# Patient Record
Sex: FEMALE | Race: WHITE | NOT HISPANIC OR LATINO | URBAN - METROPOLITAN AREA
[De-identification: names, ages, dates, MRNs, and addresses within clinical notes are randomized per-mention and may not be internally consistent; named-entity substitution may affect disease eponyms.]

---

## 2024-10-28 ENCOUNTER — INPATIENT (INPATIENT)
Facility: HOSPITAL | Age: 54
LOS: 6 days | Discharge: HOME | DRG: 65 | End: 2024-11-04
Attending: PSYCHIATRY & NEUROLOGY | Admitting: INTERNAL MEDICINE
Payer: COMMERCIAL

## 2024-10-28 VITALS
WEIGHT: 234.79 LBS | TEMPERATURE: 98 F | SYSTOLIC BLOOD PRESSURE: 184 MMHG | DIASTOLIC BLOOD PRESSURE: 124 MMHG | HEART RATE: 106 BPM | RESPIRATION RATE: 18 BRPM | OXYGEN SATURATION: 96 %

## 2024-10-28 DIAGNOSIS — E66.9 OBESITY, UNSPECIFIED: ICD-10-CM

## 2024-10-28 DIAGNOSIS — G40.409 OTHER GENERALIZED EPILEPSY AND EPILEPTIC SYNDROMES, NOT INTRACTABLE, WITHOUT STATUS EPILEPTICUS: ICD-10-CM

## 2024-10-28 DIAGNOSIS — I62.02 NONTRAUMATIC SUBACUTE SUBDURAL HEMORRHAGE: ICD-10-CM

## 2024-10-28 DIAGNOSIS — E78.5 HYPERLIPIDEMIA, UNSPECIFIED: ICD-10-CM

## 2024-10-28 DIAGNOSIS — Z91.199 PATIENT'S NONCOMPLIANCE WITH OTHER MEDICAL TREATMENT AND REGIMEN DUE TO UNSPECIFIED REASON: ICD-10-CM

## 2024-10-28 DIAGNOSIS — I10 ESSENTIAL (PRIMARY) HYPERTENSION: ICD-10-CM

## 2024-10-28 PROCEDURE — 93010 ELECTROCARDIOGRAM REPORT: CPT

## 2024-10-28 PROCEDURE — 70450 CT HEAD/BRAIN W/O DYE: CPT | Mod: 26,MC

## 2024-10-28 PROCEDURE — 99291 CRITICAL CARE FIRST HOUR: CPT

## 2024-10-28 RX ORDER — SODIUM CHLORIDE 9 MG/ML
1000 INJECTION, SOLUTION INTRAMUSCULAR; INTRAVENOUS; SUBCUTANEOUS ONCE
Refills: 0 | Status: COMPLETED | OUTPATIENT
Start: 2024-10-28 | End: 2024-10-28

## 2024-10-28 NOTE — ED ADULT TRIAGE NOTE - CHIEF COMPLAINT QUOTE
BIBA for seizure that lasted 1 minute. Pt states she has had flulike symptoms for about a week or 2. Family states pt vomited and was staring "into space" began to have a seizure.

## 2024-10-29 DIAGNOSIS — R56.9 UNSPECIFIED CONVULSIONS: ICD-10-CM

## 2024-10-29 LAB
ALBUMIN SERPL ELPH-MCNC: 4.5 G/DL — SIGNIFICANT CHANGE UP (ref 3.5–5.2)
ALP SERPL-CCNC: 125 U/L — HIGH (ref 30–115)
ALT FLD-CCNC: 21 U/L — SIGNIFICANT CHANGE UP (ref 0–41)
ANION GAP SERPL CALC-SCNC: 16 MMOL/L — HIGH (ref 7–14)
APPEARANCE UR: CLEAR — SIGNIFICANT CHANGE UP
APTT BLD: 33.6 SEC — SIGNIFICANT CHANGE UP (ref 27–39.2)
AST SERPL-CCNC: 16 U/L — SIGNIFICANT CHANGE UP (ref 0–41)
BACTERIA # UR AUTO: ABNORMAL /HPF
BASOPHILS # BLD AUTO: 0.05 K/UL — SIGNIFICANT CHANGE UP (ref 0–0.2)
BASOPHILS NFR BLD AUTO: 0.4 % — SIGNIFICANT CHANGE UP (ref 0–1)
BILIRUB SERPL-MCNC: 0.4 MG/DL — SIGNIFICANT CHANGE UP (ref 0.2–1.2)
BILIRUB UR-MCNC: NEGATIVE — SIGNIFICANT CHANGE UP
BUN SERPL-MCNC: 14 MG/DL — SIGNIFICANT CHANGE UP (ref 10–20)
CALCIUM SERPL-MCNC: 9.6 MG/DL — SIGNIFICANT CHANGE UP (ref 8.4–10.5)
CHLORIDE SERPL-SCNC: 95 MMOL/L — LOW (ref 98–110)
CO2 SERPL-SCNC: 24 MMOL/L — SIGNIFICANT CHANGE UP (ref 17–32)
COLOR SPEC: YELLOW — SIGNIFICANT CHANGE UP
CREAT SERPL-MCNC: 0.7 MG/DL — SIGNIFICANT CHANGE UP (ref 0.7–1.5)
DIFF PNL FLD: ABNORMAL
EGFR: 103 ML/MIN/1.73M2 — SIGNIFICANT CHANGE UP
EOSINOPHIL # BLD AUTO: 0.03 K/UL — SIGNIFICANT CHANGE UP (ref 0–0.7)
EOSINOPHIL NFR BLD AUTO: 0.2 % — SIGNIFICANT CHANGE UP (ref 0–8)
EPI CELLS # UR: PRESENT
FLUAV AG NPH QL: SIGNIFICANT CHANGE UP
FLUBV AG NPH QL: SIGNIFICANT CHANGE UP
GLUCOSE SERPL-MCNC: 141 MG/DL — HIGH (ref 70–99)
GLUCOSE UR QL: 100 MG/DL
HCG SERPL QL: NEGATIVE — SIGNIFICANT CHANGE UP
HCT VFR BLD CALC: 44.3 % — SIGNIFICANT CHANGE UP (ref 37–47)
HGB BLD-MCNC: 14.4 G/DL — SIGNIFICANT CHANGE UP (ref 12–16)
IMM GRANULOCYTES NFR BLD AUTO: 0.3 % — SIGNIFICANT CHANGE UP (ref 0.1–0.3)
INR BLD: 1.02 RATIO — SIGNIFICANT CHANGE UP (ref 0.65–1.3)
KETONES UR-MCNC: NEGATIVE MG/DL — SIGNIFICANT CHANGE UP
LEUKOCYTE ESTERASE UR-ACNC: NEGATIVE — SIGNIFICANT CHANGE UP
LYMPHOCYTES # BLD AUTO: 1 K/UL — LOW (ref 1.2–3.4)
LYMPHOCYTES # BLD AUTO: 7.4 % — LOW (ref 20.5–51.1)
MAGNESIUM SERPL-MCNC: 2.2 MG/DL — SIGNIFICANT CHANGE UP (ref 1.8–2.4)
MCHC RBC-ENTMCNC: 28 PG — SIGNIFICANT CHANGE UP (ref 27–31)
MCHC RBC-ENTMCNC: 32.5 G/DL — SIGNIFICANT CHANGE UP (ref 32–37)
MCV RBC AUTO: 86.2 FL — SIGNIFICANT CHANGE UP (ref 81–99)
MONOCYTES # BLD AUTO: 0.48 K/UL — SIGNIFICANT CHANGE UP (ref 0.1–0.6)
MONOCYTES NFR BLD AUTO: 3.5 % — SIGNIFICANT CHANGE UP (ref 1.7–9.3)
NEUTROPHILS # BLD AUTO: 11.99 K/UL — HIGH (ref 1.4–6.5)
NEUTROPHILS NFR BLD AUTO: 88.2 % — HIGH (ref 42.2–75.2)
NITRITE UR-MCNC: NEGATIVE — SIGNIFICANT CHANGE UP
NRBC # BLD: 0 /100 WBCS — SIGNIFICANT CHANGE UP (ref 0–0)
PH UR: 7.5 — SIGNIFICANT CHANGE UP (ref 5–8)
PLATELET # BLD AUTO: 322 K/UL — SIGNIFICANT CHANGE UP (ref 130–400)
PMV BLD: 10.8 FL — HIGH (ref 7.4–10.4)
POTASSIUM SERPL-MCNC: 3.9 MMOL/L — SIGNIFICANT CHANGE UP (ref 3.5–5)
POTASSIUM SERPL-SCNC: 3.9 MMOL/L — SIGNIFICANT CHANGE UP (ref 3.5–5)
PROT SERPL-MCNC: 7.9 G/DL — SIGNIFICANT CHANGE UP (ref 6–8)
PROT UR-MCNC: 100 MG/DL
PROTHROM AB SERPL-ACNC: 11.6 SEC — SIGNIFICANT CHANGE UP (ref 9.95–12.87)
RBC # BLD: 5.14 M/UL — SIGNIFICANT CHANGE UP (ref 4.2–5.4)
RBC # FLD: 13 % — SIGNIFICANT CHANGE UP (ref 11.5–14.5)
RBC CASTS # UR COMP ASSIST: 5 /HPF — HIGH (ref 0–4)
RSV RNA NPH QL NAA+NON-PROBE: SIGNIFICANT CHANGE UP
SARS-COV-2 RNA SPEC QL NAA+PROBE: SIGNIFICANT CHANGE UP
SODIUM SERPL-SCNC: 135 MMOL/L — SIGNIFICANT CHANGE UP (ref 135–146)
SP GR SPEC: 1.01 — SIGNIFICANT CHANGE UP (ref 1–1.03)
SQUAMOUS # UR AUTO: SIGNIFICANT CHANGE UP /HPF (ref 0–5)
TROPONIN T, HIGH SENSITIVITY RESULT: 10 NG/L — SIGNIFICANT CHANGE UP (ref 6–13)
UROBILINOGEN FLD QL: 0.2 MG/DL — SIGNIFICANT CHANGE UP (ref 0.2–1)
WBC # BLD: 13.59 K/UL — HIGH (ref 4.8–10.8)
WBC # FLD AUTO: 13.59 K/UL — HIGH (ref 4.8–10.8)
WBC UR QL: 5 /HPF — SIGNIFICANT CHANGE UP (ref 0–5)

## 2024-10-29 PROCEDURE — 70496 CT ANGIOGRAPHY HEAD: CPT | Mod: MC

## 2024-10-29 PROCEDURE — 82378 CARCINOEMBRYONIC ANTIGEN: CPT

## 2024-10-29 PROCEDURE — 70498 CT ANGIOGRAPHY NECK: CPT | Mod: 26,MC

## 2024-10-29 PROCEDURE — 97161 PT EVAL LOW COMPLEX 20 MIN: CPT | Mod: GP

## 2024-10-29 PROCEDURE — 80053 COMPREHEN METABOLIC PANEL: CPT

## 2024-10-29 PROCEDURE — 92610 EVALUATE SWALLOWING FUNCTION: CPT | Mod: GN

## 2024-10-29 PROCEDURE — 85025 COMPLETE CBC W/AUTO DIFF WBC: CPT

## 2024-10-29 PROCEDURE — 84100 ASSAY OF PHOSPHORUS: CPT

## 2024-10-29 PROCEDURE — 95700 EEG CONT REC W/VID EEG TECH: CPT

## 2024-10-29 PROCEDURE — 36226 PLACE CATH VERTEBRAL ART: CPT | Mod: RT

## 2024-10-29 PROCEDURE — 76937 US GUIDE VASCULAR ACCESS: CPT

## 2024-10-29 PROCEDURE — 36224 PLACE CATH CAROTD ART: CPT | Mod: 50

## 2024-10-29 PROCEDURE — 97166 OT EVAL MOD COMPLEX 45 MIN: CPT | Mod: GO

## 2024-10-29 PROCEDURE — 36415 COLL VENOUS BLD VENIPUNCTURE: CPT

## 2024-10-29 PROCEDURE — 86301 IMMUNOASSAY TUMOR CA 19-9: CPT

## 2024-10-29 PROCEDURE — A9579: CPT

## 2024-10-29 PROCEDURE — 70496 CT ANGIOGRAPHY HEAD: CPT | Mod: 26,MC

## 2024-10-29 PROCEDURE — 83036 HEMOGLOBIN GLYCOSYLATED A1C: CPT

## 2024-10-29 PROCEDURE — 99291 CRITICAL CARE FIRST HOUR: CPT

## 2024-10-29 PROCEDURE — 99223 1ST HOSP IP/OBS HIGH 75: CPT

## 2024-10-29 PROCEDURE — C1894: CPT

## 2024-10-29 PROCEDURE — 36227 PLACE CATH XTRNL CAROTID: CPT | Mod: LT

## 2024-10-29 PROCEDURE — 95708 EEG WO VID EA 12-26HR UNMNTR: CPT

## 2024-10-29 PROCEDURE — 70552 MRI BRAIN STEM W/DYE: CPT | Mod: 26

## 2024-10-29 PROCEDURE — ZZZZZ: CPT

## 2024-10-29 PROCEDURE — 95714 VEEG EA 12-26 HR UNMNTR: CPT

## 2024-10-29 PROCEDURE — 84443 ASSAY THYROID STIM HORMONE: CPT

## 2024-10-29 PROCEDURE — 83735 ASSAY OF MAGNESIUM: CPT

## 2024-10-29 PROCEDURE — 70552 MRI BRAIN STEM W/DYE: CPT | Mod: MC

## 2024-10-29 PROCEDURE — 82105 ALPHA-FETOPROTEIN SERUM: CPT

## 2024-10-29 PROCEDURE — C1887: CPT

## 2024-10-29 PROCEDURE — 80048 BASIC METABOLIC PNL TOTAL CA: CPT

## 2024-10-29 PROCEDURE — 93005 ELECTROCARDIOGRAM TRACING: CPT

## 2024-10-29 PROCEDURE — 80061 LIPID PANEL: CPT

## 2024-10-29 RX ORDER — LOSARTAN POTASSIUM 25 MG/1
50 TABLET ORAL ONCE
Refills: 0 | Status: COMPLETED | OUTPATIENT
Start: 2024-10-29 | End: 2024-10-29

## 2024-10-29 RX ORDER — CLONIDINE HYDROCHLORIDE 0.2 MG/1
0.1 TABLET ORAL ONCE
Refills: 0 | Status: COMPLETED | OUTPATIENT
Start: 2024-10-29 | End: 2024-10-29

## 2024-10-29 RX ORDER — NIFEDIPINE 90 MG
30 TABLET, EXTENDED RELEASE 24 HR ORAL DAILY
Refills: 0 | Status: DISCONTINUED | OUTPATIENT
Start: 2024-10-29 | End: 2024-10-29

## 2024-10-29 RX ORDER — ASPIRIN/MAG CARB/ALUMINUM AMIN 325 MG
324 TABLET ORAL ONCE
Refills: 0 | Status: COMPLETED | OUTPATIENT
Start: 2024-10-29 | End: 2024-10-29

## 2024-10-29 RX ORDER — SODIUM CHLORIDE 9 MG/ML
1000 INJECTION, SOLUTION INTRAMUSCULAR; INTRAVENOUS; SUBCUTANEOUS
Refills: 0 | Status: DISCONTINUED | OUTPATIENT
Start: 2024-10-29 | End: 2024-10-29

## 2024-10-29 RX ORDER — LORAZEPAM 2 MG
2 TABLET ORAL ONCE
Refills: 0 | Status: DISCONTINUED | OUTPATIENT
Start: 2024-10-29 | End: 2024-11-04

## 2024-10-29 RX ORDER — ENOXAPARIN SODIUM 40MG/0.4ML
40 SYRINGE (ML) SUBCUTANEOUS EVERY 24 HOURS
Refills: 0 | Status: DISCONTINUED | OUTPATIENT
Start: 2024-10-29 | End: 2024-10-29

## 2024-10-29 RX ORDER — LABETALOL HCL 200 MG
10 TABLET ORAL ONCE
Refills: 0 | Status: COMPLETED | OUTPATIENT
Start: 2024-10-29 | End: 2024-10-29

## 2024-10-29 RX ORDER — LEVETIRACETAM 500 MG/1
1000 TABLET, FILM COATED ORAL
Refills: 0 | Status: DISCONTINUED | OUTPATIENT
Start: 2024-10-29 | End: 2024-11-01

## 2024-10-29 RX ORDER — LIDOCAINE HYDROCHLORIDE 40 MG/ML
5 SOLUTION TOPICAL THREE TIMES A DAY
Refills: 0 | Status: DISCONTINUED | OUTPATIENT
Start: 2024-10-29 | End: 2024-11-04

## 2024-10-29 RX ORDER — CHLORHEXIDINE GLUCONATE 40 MG/ML
1 SOLUTION TOPICAL
Refills: 0 | Status: DISCONTINUED | OUTPATIENT
Start: 2024-10-29 | End: 2024-10-30

## 2024-10-29 RX ORDER — HEPARIN SODIUM 10000 [USP'U]/ML
5000 INJECTION INTRAVENOUS; SUBCUTANEOUS EVERY 8 HOURS
Refills: 0 | Status: DISCONTINUED | OUTPATIENT
Start: 2024-10-29 | End: 2024-10-29

## 2024-10-29 RX ORDER — ALPRAZOLAM 0.25 MG
0.5 TABLET ORAL ONCE
Refills: 0 | Status: DISCONTINUED | OUTPATIENT
Start: 2024-10-29 | End: 2024-10-29

## 2024-10-29 RX ORDER — PANTOPRAZOLE SODIUM 40 MG/1
40 TABLET, DELAYED RELEASE ORAL
Refills: 0 | Status: DISCONTINUED | OUTPATIENT
Start: 2024-10-29 | End: 2024-10-30

## 2024-10-29 RX ORDER — NIFEDIPINE 90 MG
60 TABLET, EXTENDED RELEASE 24 HR ORAL AT BEDTIME
Refills: 0 | Status: DISCONTINUED | OUTPATIENT
Start: 2024-10-29 | End: 2024-11-01

## 2024-10-29 RX ORDER — LEVETIRACETAM 500 MG/1
1000 TABLET, FILM COATED ORAL ONCE
Refills: 0 | Status: COMPLETED | OUTPATIENT
Start: 2024-10-29 | End: 2024-10-29

## 2024-10-29 RX ORDER — LABETALOL HCL 200 MG
10 TABLET ORAL ONCE
Refills: 0 | Status: DISCONTINUED | OUTPATIENT
Start: 2024-10-29 | End: 2024-10-29

## 2024-10-29 RX ORDER — HYDRALAZINE HYDROCHLORIDE 50 MG/1
10 TABLET, FILM COATED ORAL ONCE
Refills: 0 | Status: COMPLETED | OUTPATIENT
Start: 2024-10-29 | End: 2024-10-29

## 2024-10-29 RX ADMIN — Medication 10 MILLIGRAM(S): at 17:26

## 2024-10-29 RX ADMIN — HYDRALAZINE HYDROCHLORIDE 10 MILLIGRAM(S): 50 TABLET, FILM COATED ORAL at 19:39

## 2024-10-29 RX ADMIN — LEVETIRACETAM 400 MILLIGRAM(S): 500 TABLET, FILM COATED ORAL at 20:48

## 2024-10-29 RX ADMIN — Medication 10 MILLIGRAM(S): at 05:50

## 2024-10-29 RX ADMIN — Medication 60 MILLIGRAM(S): at 18:37

## 2024-10-29 RX ADMIN — Medication 10 MILLIGRAM(S): at 00:24

## 2024-10-29 RX ADMIN — Medication 0.5 MILLIGRAM(S): at 14:58

## 2024-10-29 RX ADMIN — CHLORHEXIDINE GLUCONATE 1 APPLICATION(S): 40 SOLUTION TOPICAL at 05:53

## 2024-10-29 RX ADMIN — LOSARTAN POTASSIUM 50 MILLIGRAM(S): 25 TABLET ORAL at 04:16

## 2024-10-29 RX ADMIN — CLONIDINE HYDROCHLORIDE 0.1 MILLIGRAM(S): 0.2 TABLET ORAL at 18:37

## 2024-10-29 RX ADMIN — SODIUM CHLORIDE 75 MILLILITER(S): 9 INJECTION, SOLUTION INTRAMUSCULAR; INTRAVENOUS; SUBCUTANEOUS at 05:51

## 2024-10-29 RX ADMIN — Medication 30 MILLIGRAM(S): at 15:35

## 2024-10-29 RX ADMIN — LIDOCAINE HYDROCHLORIDE 5 MILLILITER(S): 40 SOLUTION TOPICAL at 15:36

## 2024-10-29 RX ADMIN — Medication 324 MILLIGRAM(S): at 03:38

## 2024-10-29 RX ADMIN — SODIUM CHLORIDE 1000 MILLILITER(S): 9 INJECTION, SOLUTION INTRAMUSCULAR; INTRAVENOUS; SUBCUTANEOUS at 00:25

## 2024-10-29 NOTE — CONSULT NOTE ADULT - NS ATTEND AMEND GEN_ALL_CORE FT
53 yo RHF w/ [facility] currently p/w new onset witnessed GTC seizure with multiple areas of hypoattenuation on HCT suspect provoked seizure.  VEEG prelim with R PLEDs and MRI w/ subacute R temporal hematoma with recent CTA without evidence of medium-large vessel disease.  Has evidence of chronic microhemorrhages suspicious for vasculitis vs undiagnosed HTN.  Will need CTV r/o sinus thrombosis given location of hematoma.  Recommend continue Q4 neurochecks, avoid full anticoagulation, keep normotensive and consult BRYAN.  Will discuss possible DSA pending CTV results.  Continue VEEG for now.

## 2024-10-29 NOTE — ED PROVIDER NOTE - OBJECTIVE STATEMENT
54 years old female no significant history BIBA from home secondary to seizure activity witnessed by family.  As per brother, patient has been complaining feeling tired all day today.  He was talking to her and noted patient suddenly blind staring at the ceiling then become convulsing for about 1 minute.  Patient also urinated on herself at the time.  Patient become confused and unresponsive until EMS arrival.  Denies similar episode in the past.  Patient's mental status is back to her baseline in ED now.  Reports she had sore throat and upper respiratory symptoms over the past week and a half that has since resolved.  He was just feeling tired all day today.  No fever, headache, chest pain, abdominal pain, vomiting or diarrhea and urinary symptoms.  Denies history of seizure in the past.

## 2024-10-29 NOTE — H&P ADULT - NSHPPHYSICALEXAM_GEN_ALL_CORE
GENERAL:  53y/o obese W  Female NAD, resting comfortably.  HEAD:  Atraumatic, Normocephalic  EYES: EOMI, PERRLA, conjunctiva and sclera clear  NECK: Supple, No JVD, no cervical lymphadenopathy, non-tender  CHEST/LUNG: Clear to auscultation bilaterally; No wheeze, rhonchi, or rales  HEART: Regular rate and rhythm; S1&S2  ABDOMEN: Soft, Nontender, Nondistended x 4 quadrants; Bowel sounds present  EXTREMITIES:   Peripheral Pulses Present, No clubbing, no cyanosis, or no edema, no calf tenderness  PSYCH: AAOx3, cooperative, appropriate  NEUROLOGY: WNL  SKIN: WNL

## 2024-10-29 NOTE — H&P ADULT - HISTORY OF PRESENT ILLNESS
54-year-old white female with no significant past medical history comes to the hospital with first witnessed seizure as per witness patient was complaining of feeling tired woke up stared at the nila and had a generalized seizure and lost bladder function patient denies alcohol abuse or drug abuse or prior history of seizures, any fevers. in the emergency room blood patient's blood pressure was elevated and was seen and she was given IV labetalol.  No recurring seizure since  Pt is back to her base line.  Imaging in ER shows ischemic changes and white matter abnormalities. MRI recommended to r/o demyelinating disease. vs inflammatory process.   Neurology was consulted recommendations to admit to ICU stepdown with general neurochecks every 4 hours

## 2024-10-29 NOTE — H&P ADULT - ASSESSMENT
middle aged W w/ first seizure r/o demyelinating disease vs inflammatory process (neuro recommending q 4hr neuro checks)

## 2024-10-29 NOTE — CONSULT NOTE ADULT - ASSESSMENT
54y F no significant PMH presented to ED after seizure like episode, consisting of garbled speech/urinary incontinence followed by few hours of dizziness/lethargy. CTH reports decreased attenuation of periventricular and subcortical white matter.  CTA unremarkable. Patient is back to baseline this morning and neuro exam non focal. CTH concerning for etiologies related to ischemia v metastasis,  less likely demyelination.    Recommendations  - MRI Brain with and without gadolinium  - Continue q4 neuro checks   - No AEDs recommended at this time. Will reconsider if MRI Brain with acute pathology   - vEEG after MRI Brain   - Seizure precautions  - Medical management per primary team    Discussed with Dr Livingston attending   54y F no significant PMH presented to ED after seizure like episode, consisting of garbled speech/urinary incontinence followed by few hours of dizziness/lethargy. CTH reports decreased attenuation of periventricular and subcortical white matter.  CTA unremarkable. Patient is back to baseline this morning and neuro exam non focal. However, CTH concerning for etiologies related to ischemia v metastasis,  less likely demyelination.    Recommendations  - MRI Brain with and without gadolinium  - Continue q4 neuro checks   - No AEDs recommended at this time. Will reconsider if MRI Brain with acute pathology   - vEEG after MRI Brain   - Seizure precautions  - Medical management per primary team    Discussed with Dr Livingston attending

## 2024-10-29 NOTE — ED PROVIDER NOTE - PHYSICAL EXAMINATION
CONSTITUTIONAL: Well-appearing; in no apparent distress.   EYES: PERRL; EOM intact.   ENT: No tongue abrasions  CARDIOVASCULAR: Normal S1, S2; no murmurs, rubs, or gallops.   RESPIRATORY: Normal chest excursion with respiration; breath sounds clear and equal bilaterally; no wheezes, rhonchi, or rales.  GI/: Normal bowel sounds; non-distended; non-tender; no palpable organomegaly.   MS: No calf swelling and tenderness.  SKIN: Normal for age and race; warm; dry; good turgor; no apparent lesions or exudate.   NEURO/PSYCH: A & O x 4; CN II-XII grossly unremarkable.  Speaking coherently and moving all extremities.  Sensation and strength equal to bilateral upper and lower extremities.

## 2024-10-29 NOTE — PATIENT PROFILE ADULT - FALL HARM RISK - HARM RISK INTERVENTIONS

## 2024-10-29 NOTE — ED PROVIDER NOTE - INTERNATIONAL TRAVEL
Problem: Adult Inpatient Plan of Care  Goal: Plan of Care Review  PLAN OF CARE REVIEWED ON AM ROUNDS, AFREBRILE, VSS WBC 13  H/H STABLE CR1 MG 2 LFTS STABLE CHEVRON INCISION HEALING WITH STAPLES INTACT, LAST JUVE DCD, VOIDING WITHOUT PROBLEMS (+) BM  INCISIONAL PAIN CONTROLLED WITH PO OXY, UP AMBULATORY, WALKER PLACED AT BS FOR USE, GLUCOSES 100-200'S WITH SOME CORRECTION INSULIN REQUIRED ALONG WITH SCHEDULED NOVOLOG, PATIENT AND WIFE INSTRUCTED ON USE OF INSULIN PEN, PATIENT WILL NEED GLUCOMETER TEACHING PRIOR TO DC IF ENDO RECS INSULIN/ MONITORING, TELE REMAINS SR WITH CONTROLLED RATE, LOPRESSOR INCREASED PER CARDS RECS, TO RESTART ELIQUIS THIS PM, WIFE DID WELL WITH SELF MEDS, ATTENTIVE/ SUPPORTIVE TO PATIENT NEEDS, EMOTIONAL SUPPORT PROVIDED TO BOTH.       No

## 2024-10-29 NOTE — CONSULT NOTE ADULT - SUBJECTIVE AND OBJECTIVE BOX
NEUROLOGY CONSULT    HPI:  54-year-old white female with no significant past medical history comes to the hospital with first witnessed seizure as per witness patient was complaining of feeling tired woke up stared at the nila and had a generalized seizure and lost bladder function patient denies alcohol abuse or drug abuse or prior history of seizures, any fevers. in the emergency room blood patient's blood pressure was elevated and was seen and she was given IV labetalol.  No recurring seizure since  Pt is back to her base line.  Imaging in ER shows ischemic changes and white matter abnormalities. MRI recommended to r/o demyelinating disease. vs inflammatory process.   Neurology was consulted recommendations to admit to ICU stepdown with general neurochecks every 4 hours (29 Oct 2024 04:29)    Patient seen at bedside today. She notes that last night, as she was trying to fall asleep, she felt nauseas and vomited. After that her brother noted she was acting funny, and patient said she could hear herself answering him but her words did not make sense. She notes he did not mention her shaking. No LOC. She did have urinary incontinence. This episode lasted a few minutes, but for a few hours after she felt "out of it" and dizzy. She mentioned after the CT in ED that her tongue felt swollen; she does not recall biting it.     For 2-3 weeks prior she had a sore throat and headache, and felt that she was coming down with a cold as her brother was sick as well. She does not wake up with blood in her mouth. She has no personal/family history of seizures or other neuro disorders. She does not take any daily medications. No smoking/drugs/alcohol.      MEDICATIONS  Home Medications: none    MEDICATIONS  (STANDING):  chlorhexidine 2% Cloths 1 Application(s) Topical <User Schedule>  enoxaparin Injectable 40 milliGRAM(s) SubCutaneous every 24 hours  pantoprazole    Tablet 40 milliGRAM(s) Oral before breakfast  sodium chloride 0.9%. 1000 milliLiter(s) (75 mL/Hr) IV Continuous <Continuous>    MEDICATIONS  (PRN):  ALPRAZolam 0.5 milliGRAM(s) Oral once PRN prior to MRI  LORazepam   Injectable 2 milliGRAM(s) IV Push once PRN Seizure Activity      FAMILY HISTORY: no neuro disorders     SOCIAL HISTORY: negative for tobacco, alcohol, or illicit drug use.    Allergies  No Known Allergies    Intolerances        GEN: NAD, pleasant, cooperative    NEURO:   MENTAL STATUS: AAOx3  LANG/SPEECH: Fluent, intact naming, repetition & comprehension  CRANIAL NERVES:  II: Pupils equal round and reactive, no RAPD, normal visual fields  III, IV, VI: EOM intact, no gaze preference or deviation  V: normal  VII: no facial asymmetry  VIII: normal hearing to speech  MOTOR: 5/5 in both upper and lower extremities  REFLEXES: 2+ bilateral; downgoing toes  SENSORY: Normal to light touch in all extremities. No neglect.   COORD: Normal finger to nose and heel to shin, no tremor  Gait: deferred       LABS:                        14.4   13.59 )-----------( 322      ( 29 Oct 2024 00:24 )             44.3     10-29    135  |  95[L]  |  14  ----------------------------<  141[H]  3.9   |  24  |  0.7    Ca    9.6      29 Oct 2024 00:24  Mg     2.2     10-29    TPro  7.9  /  Alb  4.5  /  TBili  0.4  /  DBili  x   /  AST  16  /  ALT  21  /  AlkPhos  125[H]  10-29    Hemoglobin A1C:   Vitamin B12   PT/INR - ( 29 Oct 2024 00:25 )   PT: 11.60 sec;   INR: 1.02 ratio         PTT - ( 29 Oct 2024 00:25 )  PTT:33.6 sec  CAPILLARY BLOOD GLUCOSE          Urinalysis Basic - ( 29 Oct 2024 00:40 )    Color: Yellow / Appearance: Clear / S.012 / pH: x  Gluc: x / Ketone: Negative mg/dL  / Bili: Negative / Urobili: 0.2 mg/dL   Blood: x / Protein: 100 mg/dL / Nitrite: Negative   Leuk Esterase: Negative / RBC: 5 /HPF / WBC 5 /HPF   Sq Epi: x / Non Sq Epi: 3-5 /HPF / Bacteria: Few /HPF      Microbiology:    Urinalysis with Rflx Culture (collected 29 Oct 2024 00:40)        RADIOLOGY    < from: CT Head No Cont (10.28.24 @ 23:29) >  Impression:    Decreased attenuation of periventricular and subcortical white matter,   nonspecific in 54-year-old patient, and considerations include   inflammatory and demyelinating diseases such as multiple sclerosis.   Confluent regional decreased attenuation at right temporal lobe with loss   of gray-white junction, which could represent area of early ischemic   change. Further evaluation recommended with MR brain with IV contrast.    Chronic ischemic change to left occipital lobe.    --- End of Report ---    < end of copied text >      < from: CT Angio Head w/ IV Cont (10.29.24 @ 01:51) >  IMPRESSION:    No large vessel occlusion, aneurysm, or vascular malformation.    Scattered areas of diminished density, white matter lesions, better   depicted CT scan .    --- End of Report ---    < end of copied text >      < from: CT Angio Neck w/ IV Cont (10.29.24 @ 01:51) >  IMPRESSION:    No large vessel occlusion, aneurysm, or vascular malformation.    Scattered areas of diminished density, white matter lesions, better   depicted CT scan .    --- End of Report ---    < end of copied text >         NEUROLOGY CONSULT    HPI:  54-year-old white female with no significant past medical history comes to the hospital with first witnessed seizure as per witness patient was complaining of feeling tired woke up stared at the nila and had a generalized seizure and lost bladder function patient denies alcohol abuse or drug abuse or prior history of seizures, any fevers. in the emergency room blood patient's blood pressure was elevated and was seen and she was given IV labetalol.  No recurring seizure since  Pt is back to her base line.  Imaging in ER shows ischemic changes and white matter abnormalities. MRI recommended to r/o demyelinating disease. vs inflammatory process.   Neurology was consulted recommendations to admit to ICU stepdown with general neurochecks every 4 hours (29 Oct 2024 04:29)    Patient seen at bedside today. She notes that last night, as she was trying to fall asleep, she felt nauseas and vomited. After that her brother noted she was acting funny, and patient said she could hear herself answering him but her words did not make sense. She notes he did not mention her shaking. Unclear LOC. She did have urinary incontinence. This episode lasted a few minutes, but for a few hours after she felt "out of it" and dizzy. She mentioned after the CT in ED that her tongue felt swollen; she does not recall biting it.     For 2-3 weeks prior she had a sore throat and headache, and felt that she was coming down with a cold as her brother was sick as well. She does not wake up with blood in her mouth. She has no personal/family history of seizures or other neuro disorders. She does not take any daily medications. No smoking/drugs/alcohol.      MEDICATIONS  Home Medications: none    MEDICATIONS  (STANDING):  chlorhexidine 2% Cloths 1 Application(s) Topical <User Schedule>  enoxaparin Injectable 40 milliGRAM(s) SubCutaneous every 24 hours  pantoprazole    Tablet 40 milliGRAM(s) Oral before breakfast  sodium chloride 0.9%. 1000 milliLiter(s) (75 mL/Hr) IV Continuous <Continuous>    MEDICATIONS  (PRN):  ALPRAZolam 0.5 milliGRAM(s) Oral once PRN prior to MRI  LORazepam   Injectable 2 milliGRAM(s) IV Push once PRN Seizure Activity      FAMILY HISTORY: no neuro disorders     SOCIAL HISTORY: negative for tobacco, alcohol, or illicit drug use.    Allergies  No Known Allergies    Intolerances        GEN: NAD, pleasant, cooperative    NEURO:   MENTAL STATUS: AAOx3  LANG/SPEECH: Fluent, intact naming, repetition & comprehension  CRANIAL NERVES:  II: Pupils equal round and reactive, no RAPD, normal visual fields  III, IV, VI: EOM intact, no gaze preference or deviation  V: normal  VII: no facial asymmetry  VIII: normal hearing to speech  MOTOR: 5/5 in both upper and lower extremities  REFLEXES: 2+ bilateral; downgoing toes  SENSORY: Normal to light touch in all extremities. No neglect.   COORD: Normal finger to nose and heel to shin, no tremor  Gait: deferred       LABS:                        14.4   13.59 )-----------( 322      ( 29 Oct 2024 00:24 )             44.3     10-29    135  |  95[L]  |  14  ----------------------------<  141[H]  3.9   |  24  |  0.7    Ca    9.6      29 Oct 2024 00:24  Mg     2.2     10-29    TPro  7.9  /  Alb  4.5  /  TBili  0.4  /  DBili  x   /  AST  16  /  ALT  21  /  AlkPhos  125[H]  10-29    Hemoglobin A1C:   Vitamin B12   PT/INR - ( 29 Oct 2024 00:25 )   PT: 11.60 sec;   INR: 1.02 ratio         PTT - ( 29 Oct 2024 00:25 )  PTT:33.6 sec  CAPILLARY BLOOD GLUCOSE          Urinalysis Basic - ( 29 Oct 2024 00:40 )    Color: Yellow / Appearance: Clear / S.012 / pH: x  Gluc: x / Ketone: Negative mg/dL  / Bili: Negative / Urobili: 0.2 mg/dL   Blood: x / Protein: 100 mg/dL / Nitrite: Negative   Leuk Esterase: Negative / RBC: 5 /HPF / WBC 5 /HPF   Sq Epi: x / Non Sq Epi: 3-5 /HPF / Bacteria: Few /HPF      Microbiology:    Urinalysis with Rflx Culture (collected 29 Oct 2024 00:40)        RADIOLOGY    < from: CT Head No Cont (10.28.24 @ 23:29) >  Impression:    Decreased attenuation of periventricular and subcortical white matter,   nonspecific in 54-year-old patient, and considerations include   inflammatory and demyelinating diseases such as multiple sclerosis.   Confluent regional decreased attenuation at right temporal lobe with loss   of gray-white junction, which could represent area of early ischemic   change. Further evaluation recommended with MR brain with IV contrast.    Chronic ischemic change to left occipital lobe.    --- End of Report ---    < end of copied text >      < from: CT Angio Head w/ IV Cont (10.29.24 @ 01:51) >  IMPRESSION:    No large vessel occlusion, aneurysm, or vascular malformation.    Scattered areas of diminished density, white matter lesions, better   depicted CT scan .    --- End of Report ---    < end of copied text >      < from: CT Angio Neck w/ IV Cont (10.29.24 @ 01:51) >  IMPRESSION:    No large vessel occlusion, aneurysm, or vascular malformation.    Scattered areas of diminished density, white matter lesions, better   depicted CT scan .    --- End of Report ---    < end of copied text >         NEUROLOGY CONSULT    HPI:  54-year-old white female with no significant past medical history comes to the hospital with first witnessed seizure as per witness patient was complaining of feeling tired woke up stared at the nila and had a generalized seizure and lost bladder function patient denies alcohol abuse or drug abuse or prior history of seizures, any fevers. in the emergency room blood patient's blood pressure was elevated and was seen and she was given IV labetalol.  No recurring seizure since  Pt is back to her base line.  Imaging in ER shows ischemic changes and white matter abnormalities. MRI recommended to r/o demyelinating disease. vs inflammatory process.   Neurology was consulted recommendations to admit to ICU stepdown with general neurochecks every 4 hours (29 Oct 2024 04:29)    Patient seen at bedside today. She notes that last night, as she was trying to fall asleep, she felt nauseas and vomited. After that her brother noted she was acting funny, and patient said she could hear herself answering him but her words did not make sense. She notes he did not mention her shaking. Unclear LOC. She did have urinary incontinence. This episode lasted a few minutes, but for a few hours after she felt "out of it" and dizzy. She mentioned after the CT in ED that her tongue felt swollen; she does not recall biting it.     For 2-3 weeks prior she had a sore throat and headache, and felt that she was coming down with a cold as her brother was sick as well. She does not wake up with blood in her mouth. She has no personal/family history of seizures or other neuro disorders. She does not take any daily medications. No smoking/drugs/alcohol. She denies any previous episodes of neurological symptoms that she can recall.      MEDICATIONS  Home Medications: none    MEDICATIONS  (STANDING):  chlorhexidine 2% Cloths 1 Application(s) Topical <User Schedule>  enoxaparin Injectable 40 milliGRAM(s) SubCutaneous every 24 hours  pantoprazole    Tablet 40 milliGRAM(s) Oral before breakfast  sodium chloride 0.9%. 1000 milliLiter(s) (75 mL/Hr) IV Continuous <Continuous>    MEDICATIONS  (PRN):  ALPRAZolam 0.5 milliGRAM(s) Oral once PRN prior to MRI  LORazepam   Injectable 2 milliGRAM(s) IV Push once PRN Seizure Activity      FAMILY HISTORY: no neuro disorders     SOCIAL HISTORY: negative for tobacco, alcohol, or illicit drug use.    Allergies  No Known Allergies    Intolerances        GEN: NAD, pleasant, cooperative    NEURO:   MENTAL STATUS: AAOx3  LANG/SPEECH: Fluent, intact naming, repetition & comprehension  CRANIAL NERVES:  II: Pupils equal round and reactive, no RAPD, normal visual fields  III, IV, VI: EOM intact, no gaze preference or deviation  V: normal  VII: no facial asymmetry  VIII: normal hearing to speech  MOTOR: 5/5 in both upper and lower extremities  REFLEXES: 2+ bilateral; downgoing toes  SENSORY: Normal to light touch in all extremities. No neglect.   COORD: Normal finger to nose and heel to shin, no tremor  Gait: deferred       LABS:                        14.4   13.59 )-----------( 322      ( 29 Oct 2024 00:24 )             44.3     10-29    135  |  95[L]  |  14  ----------------------------<  141[H]  3.9   |  24  |  0.7    Ca    9.6      29 Oct 2024 00:24  Mg     2.2     10-29    TPro  7.9  /  Alb  4.5  /  TBili  0.4  /  DBili  x   /  AST  16  /  ALT  21  /  AlkPhos  125[H]  10-29    Hemoglobin A1C:   Vitamin B12   PT/INR - ( 29 Oct 2024 00:25 )   PT: 11.60 sec;   INR: 1.02 ratio         PTT - ( 29 Oct 2024 00:25 )  PTT:33.6 sec  CAPILLARY BLOOD GLUCOSE          Urinalysis Basic - ( 29 Oct 2024 00:40 )    Color: Yellow / Appearance: Clear / S.012 / pH: x  Gluc: x / Ketone: Negative mg/dL  / Bili: Negative / Urobili: 0.2 mg/dL   Blood: x / Protein: 100 mg/dL / Nitrite: Negative   Leuk Esterase: Negative / RBC: 5 /HPF / WBC 5 /HPF   Sq Epi: x / Non Sq Epi: 3-5 /HPF / Bacteria: Few /HPF      Microbiology:    Urinalysis with Rflx Culture (collected 29 Oct 2024 00:40)        RADIOLOGY    < from: CT Head No Cont (10.28.24 @ 23:29) >  Impression:    Decreased attenuation of periventricular and subcortical white matter,   nonspecific in 54-year-old patient, and considerations include   inflammatory and demyelinating diseases such as multiple sclerosis.   Confluent regional decreased attenuation at right temporal lobe with loss   of gray-white junction, which could represent area of early ischemic   change. Further evaluation recommended with MR brain with IV contrast.    Chronic ischemic change to left occipital lobe.    --- End of Report ---    < end of copied text >      < from: CT Angio Head w/ IV Cont (10.29.24 @ 01:51) >  IMPRESSION:    No large vessel occlusion, aneurysm, or vascular malformation.    Scattered areas of diminished density, white matter lesions, better   depicted CT scan .    --- End of Report ---    < end of copied text >      < from: CT Angio Neck w/ IV Cont (10.29.24 @ :51) >  IMPRESSION:    No large vessel occlusion, aneurysm, or vascular malformation.    Scattered areas of diminished density, white matter lesions, better   depicted CT scan .    --- End of Report ---    < end of copied text >

## 2024-10-29 NOTE — PROGRESS NOTE ADULT - SUBJECTIVE AND OBJECTIVE BOX
YOLANDA SANTILLAN 54y Female  MRN#: [id number]     Hospital Day:     Pt is currently admitted with the primary diagnosis of  Convulsions        SUBJECTIVE     Patient was seen this morning. Denies any complaints.                                             ----------------------------------------------------------  OBJECTIVE  PAST MEDICAL & SURGICAL HISTORY  No pertinent past medical history                                              -----------------------------------------------------------  ALLERGIES:  No Known Allergies                                            ------------------------------------------------------------    HOME MEDICATIONS  Home Medications:                           MEDICATIONS:  STANDING MEDICATIONS  chlorhexidine 2% Cloths 1 Application(s) Topical <User Schedule>  heparin   Injectable 5000 Unit(s) SubCutaneous every 8 hours  pantoprazole    Tablet 40 milliGRAM(s) Oral before breakfast  sodium chloride 0.9%. 1000 milliLiter(s) IV Continuous <Continuous>    PRN MEDICATIONS  LORazepam   Injectable 2 milliGRAM(s) IV Push once PRN                                            ------------------------------------------------------------  VITAL SIGNS: Last 24 Hours  T(C): 37.2 (29 Oct 2024 07:10), Max: 37.2 (29 Oct 2024 05:27)  T(F): 99 (29 Oct 2024 07:10), Max: 99 (29 Oct 2024 07:10)  HR: 92 (29 Oct 2024 06:15) (92 - 106)  BP: 163/72 (29 Oct 2024 06:15) (139/65 - 222/123)  BP(mean): 104 (29 Oct 2024 06:15) (104 - 132)  RR: 20 (29 Oct 2024 07:10) (18 - 30)  SpO2: 95% (29 Oct 2024 06:15) (95% - 99%)                                             --------------------------------------------------------------  LABS:                        14.4   13.59 )-----------( 322      ( 29 Oct 2024 00:24 )             44.3     10    135  |  95[L]  |  14  ----------------------------<  141[H]  3.9   |  24  |  0.7    Ca    9.6      29 Oct 2024 00:24  Mg     2.2     10-    TPro  7.9  /  Alb  4.5  /  TBili  0.4  /  DBili  x   /  AST  16  /  ALT  21  /  AlkPhos  125[H]  10-    PT/INR - ( 29 Oct 2024 00:25 )   PT: 11.60 sec;   INR: 1.02 ratio         PTT - ( 29 Oct 2024 00:25 )  PTT:33.6 sec  Urinalysis Basic - ( 29 Oct 2024 00:40 )    Color: Yellow / Appearance: Clear / S.012 / pH: x  Gluc: x / Ketone: Negative mg/dL  / Bili: Negative / Urobili: 0.2 mg/dL   Blood: x / Protein: 100 mg/dL / Nitrite: Negative   Leuk Esterase: Negative / RBC: 5 /HPF / WBC 5 /HPF   Sq Epi: x / Non Sq Epi: 3-5 /HPF / Bacteria: Few /HPF              Urinalysis with Rflx Culture (collected 29 Oct 2024 00:40)                                                --------------------------------------------------------------  PHYSICAL EXAM:  GENERAL: NAD, lying in bed comfortably  NERVOUS SYSTEM:  Alert & Oriented X3   CHEST/LUNG: Clear to auscultation bilaterally.   HEART: regular rate and rhythm;   ABDOMEN: Soft, Nontender, Nondistended  EXTREMITIES: No edema.                                          --------------------------------------------------------------

## 2024-10-29 NOTE — H&P ADULT - PROBLEM SELECTOR PLAN 1
Neurologist recommended neuro cks q4hr and neuro ICU stepdown admission  MRI in am  possible EEG  formal neuro consult  BP control

## 2024-10-29 NOTE — ED PROVIDER NOTE - PROGRESS NOTE DETAILS
spoke with Dr Bourgeois (Neuro), patient will need MRI w/ IV contrast and EEG. given elevated BP, admit to SDU for q4 neuro check

## 2024-10-29 NOTE — ED PROVIDER NOTE - CONSIDERATION OF ADMISSION OBSERVATION
Consideration of Admission/Observation [Appropriate medications for patient's presenting complaints were ordered and effects were reassessed].   [Patient's external records were reviewed]. [Additional history was obtained from brother     Escalation to [admission was considered.  At this time, patient requires inpatient hospitalization [- monitored setting].

## 2024-10-29 NOTE — H&P ADULT - NSHPLABSRESULTS_GEN_ALL_CORE
14.4   13.59 )-----------( 322      ( 29 Oct 2024 00:24 )             44.3       10-29    135  |  95[L]  |  14  ----------------------------<  141[H]  3.9   |  24  |  0.7    Ca    9.6      29 Oct 2024 00:24  Mg     2.2     10-29    TPro  7.9  /  Alb  4.5  /  TBili  0.4  /  DBili  x   /  AST  16  /  ALT  21  /  AlkPhos  125[H]  10-29        Urinalysis Basic - ( 29 Oct 2024 00:40 )    Color: Yellow / Appearance: Clear / S.012 / pH: x  Gluc: x / Ketone: Negative mg/dL  / Bili: Negative / Urobili: 0.2 mg/dL   Blood: x / Protein: 100 mg/dL / Nitrite: Negative   Leuk Esterase: Negative / RBC: 5 /HPF / WBC 5 /HPF   Sq Epi: x / Non Sq Epi: 3-5 /HPF / Bacteria: Few /HPF    PT/INR - ( 29 Oct 2024 00:25 )   PT: 11.60 sec;   INR: 1.02 ratio         PTT - ( 29 Oct 2024 00:25 )  PTT:33.6 sec    < from: CT Head No Cont (10.28.24 @ 23:29) >    mpression:    Decreased attenuation of periventricular and subcortical white matter,   nonspecific in 54-year-old patient, and considerations include   inflammatory and demyelinating diseases such as multiple sclerosis.   Confluent regional decreased attenuation at right temporal lobe with loss   of gray-white junction, which could represent area of early ischemic   change. Further evaluation recommended with MR brain with IV contrast.    Chronic ischemic change to left occipital lobe.      < end of copied text >    < from: CT Angio Neck w/ IV Cont (10.29.24 @ 01:51) >      IMPRESSION:    No large vessel occlusion, aneurysm, or vascular malformation.        < end of copied text >                CAPILLARY BLOOD GLUCOSE

## 2024-10-29 NOTE — PROVIDER CONTACT NOTE (OTHER) - SITUATION
Pt c/o of trouble speaking when EEG tech woke her up. Pt has been receiving meds to control hypertension. Neuro status checked - pt asymptomatic. PERRLA, BP still elevated. No other complaints of pain

## 2024-10-29 NOTE — CHART NOTE - NSCHARTNOTEFT_GEN_A_CORE
called by neurologist regarding abnormal VEEG. reading .  MD recommend to start  IV  & po Skyler butt MD's rec.

## 2024-10-29 NOTE — H&P ADULT - CRITICAL CARE ATTENDING COMMENT
Pt seen by PA and asim and agree w above.  Pt  is a 53 yo female no sig PMHx.  presented after having seizure like activity, + loss of bladder control.  CT head shows ?demylineation.  Recommend MRI. BP control.  Neurology eval.  Pt reports tongue swelling post ct,.  No lip swelling. Speaking in full sentences. Willl give diphenhydramine 25 mg PO once. Monitor  for angioedema.

## 2024-10-29 NOTE — PROGRESS NOTE ADULT - ASSESSMENT
55 y/o no pmhx, presenting for new onset seizure.     #New onset seizure   - CT head: Decreased attenuation of periventricular and subcortical white matter, nonspecific in 54-year-old patient, and considerations include inflammatory and demyelinating diseases such as multiple sclerosis. Chronic ischemic change to left occipital lobe.  - follow up neuro consult    q 4hrs neuro check   - f/u MRI   - f/u EEG     DIET: DASH  DVT prophylaxis: lovenox  CODE: full  DISPO: stepdown

## 2024-10-30 LAB
ALBUMIN SERPL ELPH-MCNC: 3.9 G/DL — SIGNIFICANT CHANGE UP (ref 3.5–5.2)
ALP SERPL-CCNC: 105 U/L — SIGNIFICANT CHANGE UP (ref 30–115)
ALT FLD-CCNC: 18 U/L — SIGNIFICANT CHANGE UP (ref 0–41)
ANION GAP SERPL CALC-SCNC: 14 MMOL/L — SIGNIFICANT CHANGE UP (ref 7–14)
AST SERPL-CCNC: 16 U/L — SIGNIFICANT CHANGE UP (ref 0–41)
BASOPHILS # BLD AUTO: 0.04 K/UL — SIGNIFICANT CHANGE UP (ref 0–0.2)
BASOPHILS NFR BLD AUTO: 0.4 % — SIGNIFICANT CHANGE UP (ref 0–1)
BILIRUB SERPL-MCNC: 0.8 MG/DL — SIGNIFICANT CHANGE UP (ref 0.2–1.2)
BUN SERPL-MCNC: 10 MG/DL — SIGNIFICANT CHANGE UP (ref 10–20)
CALCIUM SERPL-MCNC: 9.4 MG/DL — SIGNIFICANT CHANGE UP (ref 8.4–10.5)
CHLORIDE SERPL-SCNC: 104 MMOL/L — SIGNIFICANT CHANGE UP (ref 98–110)
CO2 SERPL-SCNC: 23 MMOL/L — SIGNIFICANT CHANGE UP (ref 17–32)
CREAT SERPL-MCNC: 0.7 MG/DL — SIGNIFICANT CHANGE UP (ref 0.7–1.5)
EGFR: 103 ML/MIN/1.73M2 — SIGNIFICANT CHANGE UP
EOSINOPHIL # BLD AUTO: 0.23 K/UL — SIGNIFICANT CHANGE UP (ref 0–0.7)
EOSINOPHIL NFR BLD AUTO: 2.3 % — SIGNIFICANT CHANGE UP (ref 0–8)
GLUCOSE SERPL-MCNC: 91 MG/DL — SIGNIFICANT CHANGE UP (ref 70–99)
HCT VFR BLD CALC: 42.3 % — SIGNIFICANT CHANGE UP (ref 37–47)
HGB BLD-MCNC: 13.9 G/DL — SIGNIFICANT CHANGE UP (ref 12–16)
IMM GRANULOCYTES NFR BLD AUTO: 0.4 % — HIGH (ref 0.1–0.3)
LYMPHOCYTES # BLD AUTO: 2.24 K/UL — SIGNIFICANT CHANGE UP (ref 1.2–3.4)
LYMPHOCYTES # BLD AUTO: 22 % — SIGNIFICANT CHANGE UP (ref 20.5–51.1)
MAGNESIUM SERPL-MCNC: 2.1 MG/DL — SIGNIFICANT CHANGE UP (ref 1.8–2.4)
MCHC RBC-ENTMCNC: 28.5 PG — SIGNIFICANT CHANGE UP (ref 27–31)
MCHC RBC-ENTMCNC: 32.9 G/DL — SIGNIFICANT CHANGE UP (ref 32–37)
MCV RBC AUTO: 86.7 FL — SIGNIFICANT CHANGE UP (ref 81–99)
MONOCYTES # BLD AUTO: 1.04 K/UL — HIGH (ref 0.1–0.6)
MONOCYTES NFR BLD AUTO: 10.2 % — HIGH (ref 1.7–9.3)
NEUTROPHILS # BLD AUTO: 6.59 K/UL — HIGH (ref 1.4–6.5)
NEUTROPHILS NFR BLD AUTO: 64.7 % — SIGNIFICANT CHANGE UP (ref 42.2–75.2)
NRBC # BLD: 0 /100 WBCS — SIGNIFICANT CHANGE UP (ref 0–0)
PHOSPHATE SERPL-MCNC: 3.3 MG/DL — SIGNIFICANT CHANGE UP (ref 2.1–4.9)
PLATELET # BLD AUTO: 308 K/UL — SIGNIFICANT CHANGE UP (ref 130–400)
PMV BLD: 10.1 FL — SIGNIFICANT CHANGE UP (ref 7.4–10.4)
POTASSIUM SERPL-MCNC: 3.5 MMOL/L — SIGNIFICANT CHANGE UP (ref 3.5–5)
POTASSIUM SERPL-SCNC: 3.5 MMOL/L — SIGNIFICANT CHANGE UP (ref 3.5–5)
PROT SERPL-MCNC: 7.2 G/DL — SIGNIFICANT CHANGE UP (ref 6–8)
RBC # BLD: 4.88 M/UL — SIGNIFICANT CHANGE UP (ref 4.2–5.4)
RBC # FLD: 13.2 % — SIGNIFICANT CHANGE UP (ref 11.5–14.5)
SODIUM SERPL-SCNC: 141 MMOL/L — SIGNIFICANT CHANGE UP (ref 135–146)
WBC # BLD: 10.18 K/UL — SIGNIFICANT CHANGE UP (ref 4.8–10.8)
WBC # FLD AUTO: 10.18 K/UL — SIGNIFICANT CHANGE UP (ref 4.8–10.8)

## 2024-10-30 PROCEDURE — 99233 SBSQ HOSP IP/OBS HIGH 50: CPT

## 2024-10-30 PROCEDURE — 93010 ELECTROCARDIOGRAM REPORT: CPT

## 2024-10-30 PROCEDURE — 99232 SBSQ HOSP IP/OBS MODERATE 35: CPT

## 2024-10-30 PROCEDURE — 95720 EEG PHY/QHP EA INCR W/VEEG: CPT

## 2024-10-30 PROCEDURE — 70496 CT ANGIOGRAPHY HEAD: CPT | Mod: 26

## 2024-10-30 RX ORDER — METOCLOPRAMIDE HCL 10 MG
10 TABLET ORAL ONCE
Refills: 0 | Status: COMPLETED | OUTPATIENT
Start: 2024-10-30 | End: 2024-10-30

## 2024-10-30 RX ADMIN — LEVETIRACETAM 1000 MILLIGRAM(S): 500 TABLET, FILM COATED ORAL at 17:40

## 2024-10-30 RX ADMIN — CHLORHEXIDINE GLUCONATE 1 APPLICATION(S): 40 SOLUTION TOPICAL at 05:56

## 2024-10-30 RX ADMIN — Medication 60 MILLIGRAM(S): at 21:33

## 2024-10-30 RX ADMIN — LEVETIRACETAM 1000 MILLIGRAM(S): 500 TABLET, FILM COATED ORAL at 05:52

## 2024-10-30 NOTE — PROGRESS NOTE ADULT - ASSESSMENT
53 y/o no pmhx, presenting for new onset seizure.     #New onset seizure   - CT head: Decreased attenuation of periventricular and subcortical white matter, nonspecific in 54-year-old patient, and considerations include inflammatory and demyelinating diseases such as multiple sclerosis. Chronic ischemic change to left occipital lobe.  -MRI:  1.  Late-subacute parenchymal hematoma within the right temporal lobe measuring about 2.3 cm with mild surrounding edema. No nodular or masslike enhancement but recommend follow-up imaging to demonstrate complete resolution.  2.  Mild-moderate white matter disease with corpus callosum involvement, nonspecific. Diagnostic considerations include chronic microvascular changes, demyelination and gliosis.  3.  Numerous microhemorrhages throughout the brain which can reflect sequela of hypertensive microhemorrhage, amyloid angiopathy, or prior inflammation/infection.  - started no keppra per neuro  - continue q 4hrs neuro check   - f/u video EEG   - CT venogram     DIET: DASH  DVT prophylaxis: lovenox  CODE: full  DISPO: stepdown

## 2024-10-30 NOTE — PROGRESS NOTE ADULT - SUBJECTIVE AND OBJECTIVE BOX
YOLANDA SANTILLAN 54y Female  MRN#: [id number]     Hospital Day: 1d    Pt is currently admitted with the primary diagnosis of  Convulsions        SUBJECTIVE     Patient was seen this morning. Denies any complaints.                                             ----------------------------------------------------------  OBJECTIVE  PAST MEDICAL & SURGICAL HISTORY  No pertinent past medical history                                              -----------------------------------------------------------  ALLERGIES:  No Known Allergies                                            ------------------------------------------------------------    HOME MEDICATIONS  Home Medications:                           MEDICATIONS:  STANDING MEDICATIONS  chlorhexidine 2% Cloths 1 Application(s) Topical <User Schedule>  levETIRAcetam 1000 milliGRAM(s) Oral two times a day  NIFEdipine XL 60 milliGRAM(s) Oral at bedtime  pantoprazole    Tablet 40 milliGRAM(s) Oral before breakfast    PRN MEDICATIONS  lidocaine 2% Viscous 5 milliLiter(s) Swish and Spit three times a day PRN  LORazepam   Injectable 2 milliGRAM(s) IV Push once PRN                                            ------------------------------------------------------------  VITAL SIGNS: Last 24 Hours  T(C): 36.3 (30 Oct 2024 07:10), Max: 37.1 (29 Oct 2024 15:05)  T(F): 97.3 (30 Oct 2024 07:10), Max: 98.8 (29 Oct 2024 23:25)  HR: 93 (30 Oct 2024 07:10) (83 - 100)  BP: 137/75 (30 Oct 2024 07:10) (125/56 - 193/107)  BP(mean): 99 (30 Oct 2024 07:10) (81 - 143)  RR: 16 (30 Oct 2024 07:10) (16 - 25)  SpO2: 93% (30 Oct 2024 07:10) (93% - 98%)      10-29-24 @ 07:01  -  10-30-24 @ 07:00  --------------------------------------------------------  IN: 1615 mL / OUT: 0 mL / NET: 1615 mL                                             --------------------------------------------------------------  LABS:                        14.4   13.59 )-----------( 322      ( 29 Oct 2024 00:24 )             44.3     10    135  |  95[L]  |  14  ----------------------------<  141[H]  3.9   |  24  |  0.7    Ca    9.6      29 Oct 2024 00:24  Mg     2.2     10-29    TPro  7.9  /  Alb  4.5  /  TBili  0.4  /  DBili  x   /  AST  16  /  ALT  21  /  AlkPhos  125[H]  10    PT/INR - ( 29 Oct 2024 00:25 )   PT: 11.60 sec;   INR: 1.02 ratio         PTT - ( 29 Oct 2024 00:25 )  PTT:33.6 sec  Urinalysis Basic - ( 29 Oct 2024 00:40 )    Color: Yellow / Appearance: Clear / S.012 / pH: x  Gluc: x / Ketone: Negative mg/dL  / Bili: Negative / Urobili: 0.2 mg/dL   Blood: x / Protein: 100 mg/dL / Nitrite: Negative   Leuk Esterase: Negative / RBC: 5 /HPF / WBC 5 /HPF   Sq Epi: x / Non Sq Epi: 3-5 /HPF / Bacteria: Few /HPF              Urinalysis with Rflx Culture (collected 29 Oct 2024 00:40)                                                --------------------------------------------------------------  PHYSICAL EXAM:  GENERAL: NAD, lying in bed comfortably  NERVOUS SYSTEM:  Alert & Oriented X3   CHEST/LUNG: Clear to auscultation bilaterally.   HEART: regular rate and rhythm;   ABDOMEN: Soft, Nontender, Nondistended  EXTREMITIES: No edema.                                          --------------------------------------------------------------

## 2024-10-30 NOTE — CHART NOTE - NSCHARTNOTEFT_GEN_A_CORE
TRANSFER NOTE    From: [facility]   To: Neurology at [place]  Accepting Physician: Dr. Alvarado       Patient is a 54y old  Female who presents with a chief complaint of s/p generalized seizure (30 Oct 2024 08:30)      HPI: 54-year-old white female with no significant past medical history comes to the hospital with first witnessed seizure as per witness patient was complaining of feeling tired woke up stared at the nila and had a generalized seizure and lost bladder function patient denies alcohol abuse or drug abuse or prior history of seizures, any fevers. in the emergency room blood patient's blood pressure was elevated and was seen and she was given IV labetalol.  No recurring seizure since  Pt is back to her base line.     Hospital Course:  No seizures while admitted.  CT head: Decreased attenuation of periventricular and subcortical white matter, nonspecific in 54-year-old patient, and considerations include inflammatory and demyelinating diseases such as multiple sclerosis. Chronic ischemic change to left occipital lobe. MRI: 1.  Late-subacute parenchymal hematoma within the right temporal lobe measuring about 2.3 cm with mild surrounding edema. No nodular or masslike enhancement but recommend follow-up imaging to demonstrate complete resolution. 2.  Mild-moderate white matter disease with corpus callosum involvement, nonspecific. Diagnostic considerations include chronic microvascular changes, demyelination and gliosis. 3.  Numerous microhemorrhages throughout the brain which can reflect sequela of hypertensive microhemorrhage, amyloid angiopathy, or prior inflammation/infection.  Video EEG on going. Neurology recommended starting Keppra and doing CT venogram, both are ordered.       T(C): 36.3 (10-30-24 @ 07:10), Max: 37.1 (10-29-24 @ 15:05)  HR: 93 (10-30-24 @ 07:10) (83 - 100)  BP: 137/75 (10-30-24 @ 07:10) (125/56 - 193/107)  RR: 16 (10-30-24 @ 07:10) (16 - 25)  SpO2: 93% (10-30-24 @ 07:10) (93% - 98%)  Wt(kg): --Vital Signs Last 24 Hrs  T(C): 36.3 (30 Oct 2024 07:10), Max: 37.1 (29 Oct 2024 15:05)  T(F): 97.3 (30 Oct 2024 07:10), Max: 98.8 (29 Oct 2024 23:25)  HR: 93 (30 Oct 2024 07:10) (83 - 100)  BP: 137/75 (30 Oct 2024 07:10) (125/56 - 193/107)  BP(mean): 99 (30 Oct 2024 07:10) (81 - 143)  RR: 16 (30 Oct 2024 07:10) (16 - 25)  SpO2: 93% (30 Oct 2024 07:10) (93% - 98%)    Parameters below as of 30 Oct 2024 04:33  Patient On (Oxygen Delivery Method): room air      PHYSICAL EXAM:  GENERAL: NAD  HEART: Regular rate and rhythm  LUNG: Clear to auscultation bilaterally, No wheezes, No rhonchi  ABDOMEN: Soft, Nontender, Nondistended  NEURO: Alert & Oriented X3  EXTREMITIES: No LE edema, No calf tenderness      MEDICATIONS:  chlorhexidine 2% Cloths 1 Application(s) Topical <User Schedule>  levETIRAcetam 1000 milliGRAM(s) Oral two times a day  lidocaine 2% Viscous 5 milliLiter(s) Swish and Spit three times a day PRN  LORazepam   Injectable 2 milliGRAM(s) IV Push once PRN  NIFEdipine XL 60 milliGRAM(s) Oral at bedtime  pantoprazole    Tablet 40 milliGRAM(s) Oral before breakfast          LABS:                        14.4   13.59 )-----------( 322      ( 29 Oct 2024 00:24 )             44.3     10-29    135  |  95[L]  |  14  ----------------------------<  141[H]  3.9   |  24  |  0.7    Ca    9.6      29 Oct 2024 00:24  Mg     2.2     10-29    TPro  7.9  /  Alb  4.5  /  TBili  0.4  /  DBili  x   /  AST  16  /  ALT  21  /  AlkPhos  125[H]  10-29    PT/INR - ( 29 Oct 2024 00:25 )   PT: 11.60 sec;   INR: 1.02 ratio         PTT - ( 29 Oct 2024 00:25 )  PTT:33.6 sec  Urinalysis Basic - ( 29 Oct 2024 00:40 )    Color: Yellow / Appearance: Clear / S.012 / pH: x  Gluc: x / Ketone: Negative mg/dL  / Bili: Negative / Urobili: 0.2 mg/dL   Blood: x / Protein: 100 mg/dL / Nitrite: Negative   Leuk Esterase: Negative / RBC: 5 /HPF / WBC 5 /HPF   Sq Epi: x / Non Sq Epi: 3-5 /HPF / Bacteria: Few /HPF      CAPILLARY BLOOD GLUCOSE            Urinalysis Basic - ( 29 Oct 2024 00:40 )    Color: Yellow / Appearance: Clear / S.012 / pH: x  Gluc: x / Ketone: Negative mg/dL  / Bili: Negative / Urobili: 0.2 mg/dL   Blood: x / Protein: 100 mg/dL / Nitrite: Negative   Leuk Esterase: Negative / RBC: 5 /HPF / WBC 5 /HPF   Sq Epi: x / Non Sq Epi: 3-5 /HPF / Bacteria: Few /HPF          RADIOLOGY & ADDITIONAL TESTS:    Assessment/Plan:    55 y/o no pmhx, presenting for new onset seizure.     #New onset seizure   - CT head: Decreased attenuation of periventricular and subcortical white matter, nonspecific in 54-year-old patient, and considerations include inflammatory and demyelinating diseases such as multiple sclerosis. Chronic ischemic change to left occipital lobe.  -MRI:  1.  Late-subacute parenchymal hematoma within the right temporal lobe measuring about 2.3 cm with mild surrounding edema. No nodular or masslike enhancement but recommend follow-up imaging to demonstrate complete resolution.  2.  Mild-moderate white matter disease with corpus callosum involvement, nonspecific. Diagnostic considerations include chronic microvascular changes, demyelination and gliosis.  3.  Numerous microhemorrhages throughout the brain which can reflect sequela of hypertensive microhemorrhage, amyloid angiopathy, or prior inflammation/infection.  - started no keppra   - continue q 4hrs neuro check   - f/u video EEG   - CT venogram f/u   - transfer to stroke unit in [place] under Dr. Alvarado's care   - avoid anticoagulation / antiplatelets     DIET: DASH  DVT prophylaxis: SCDs  CODE: full  DISPO: stepdown TRANSFER NOTE    From: ICU south   To: Neurology at [facility]  Accepting Physician: Dr. Alvarado       Patient is a 54y old  Female who presents with a chief complaint of s/p generalized seizure (30 Oct 2024 08:30)      HPI: 54-year-old white female with no significant past medical history comes to the hospital with first witnessed seizure as per witness patient was complaining of feeling tired woke up stared at the nila and had a generalized seizure and lost bladder function patient denies alcohol abuse or drug abuse or prior history of seizures, any fevers. in the emergency room blood patient's blood pressure was elevated and was seen and she was given IV labetalol.  No recurring seizure since  Pt is back to her base line.     Hospital Course:  No seizures while admitted.  CT head: Decreased attenuation of periventricular and subcortical white matter, nonspecific in 54-year-old patient, and considerations include inflammatory and demyelinating diseases such as multiple sclerosis. Chronic ischemic change to left occipital lobe. MRI: 1.  Late-subacute parenchymal hematoma within the right temporal lobe measuring about 2.3 cm with mild surrounding edema. No nodular or masslike enhancement but recommend follow-up imaging to demonstrate complete resolution. 2.  Mild-moderate white matter disease with corpus callosum involvement, nonspecific. Diagnostic considerations include chronic microvascular changes, demyelination and gliosis. 3.  Numerous microhemorrhages throughout the brain which can reflect sequela of hypertensive microhemorrhage, amyloid angiopathy, or prior inflammation/infection.  Video EEG on going. Neurology recommended starting Keppra and doing CT venogram, both are ordered.       T(C): 36.3 (10-30-24 @ 07:10), Max: 37.1 (10-29-24 @ 15:05)  HR: 93 (10-30-24 @ 07:10) (83 - 100)  BP: 137/75 (10-30-24 @ 07:10) (125/56 - 193/107)  RR: 16 (10-30-24 @ 07:10) (16 - 25)  SpO2: 93% (10-30-24 @ 07:10) (93% - 98%)  Wt(kg): --Vital Signs Last 24 Hrs  T(C): 36.3 (30 Oct 2024 07:10), Max: 37.1 (29 Oct 2024 15:05)  T(F): 97.3 (30 Oct 2024 07:10), Max: 98.8 (29 Oct 2024 23:25)  HR: 93 (30 Oct 2024 07:10) (83 - 100)  BP: 137/75 (30 Oct 2024 07:10) (125/56 - 193/107)  BP(mean): 99 (30 Oct 2024 07:10) (81 - 143)  RR: 16 (30 Oct 2024 07:10) (16 - 25)  SpO2: 93% (30 Oct 2024 07:10) (93% - 98%)    Parameters below as of 30 Oct 2024 04:33  Patient On (Oxygen Delivery Method): room air      PHYSICAL EXAM:  GENERAL: NAD  HEART: Regular rate and rhythm  LUNG: Clear to auscultation bilaterally, No wheezes, No rhonchi  ABDOMEN: Soft, Nontender, Nondistended  NEURO: Alert & Oriented X3  EXTREMITIES: No LE edema, No calf tenderness      MEDICATIONS:  chlorhexidine 2% Cloths 1 Application(s) Topical <User Schedule>  levETIRAcetam 1000 milliGRAM(s) Oral two times a day  lidocaine 2% Viscous 5 milliLiter(s) Swish and Spit three times a day PRN  LORazepam   Injectable 2 milliGRAM(s) IV Push once PRN  NIFEdipine XL 60 milliGRAM(s) Oral at bedtime  pantoprazole    Tablet 40 milliGRAM(s) Oral before breakfast          LABS:                        14.4   13.59 )-----------( 322      ( 29 Oct 2024 00:24 )             44.3     10-29    135  |  95[L]  |  14  ----------------------------<  141[H]  3.9   |  24  |  0.7    Ca    9.6      29 Oct 2024 00:24  Mg     2.2     10-29    TPro  7.9  /  Alb  4.5  /  TBili  0.4  /  DBili  x   /  AST  16  /  ALT  21  /  AlkPhos  125[H]  10-29    PT/INR - ( 29 Oct 2024 00:25 )   PT: 11.60 sec;   INR: 1.02 ratio         PTT - ( 29 Oct 2024 00:25 )  PTT:33.6 sec  Urinalysis Basic - ( 29 Oct 2024 00:40 )    Color: Yellow / Appearance: Clear / S.012 / pH: x  Gluc: x / Ketone: Negative mg/dL  / Bili: Negative / Urobili: 0.2 mg/dL   Blood: x / Protein: 100 mg/dL / Nitrite: Negative   Leuk Esterase: Negative / RBC: 5 /HPF / WBC 5 /HPF   Sq Epi: x / Non Sq Epi: 3-5 /HPF / Bacteria: Few /HPF      CAPILLARY BLOOD GLUCOSE            Urinalysis Basic - ( 29 Oct 2024 00:40 )    Color: Yellow / Appearance: Clear / S.012 / pH: x  Gluc: x / Ketone: Negative mg/dL  / Bili: Negative / Urobili: 0.2 mg/dL   Blood: x / Protein: 100 mg/dL / Nitrite: Negative   Leuk Esterase: Negative / RBC: 5 /HPF / WBC 5 /HPF   Sq Epi: x / Non Sq Epi: 3-5 /HPF / Bacteria: Few /HPF          RADIOLOGY & ADDITIONAL TESTS:    Assessment/Plan:    55 y/o no pmhx, presenting for new onset seizure.     #New onset seizure   - CT head: Decreased attenuation of periventricular and subcortical white matter, nonspecific in 54-year-old patient, and considerations include inflammatory and demyelinating diseases such as multiple sclerosis. Chronic ischemic change to left occipital lobe.  -MRI:  1.  Late-subacute parenchymal hematoma within the right temporal lobe measuring about 2.3 cm with mild surrounding edema. No nodular or masslike enhancement but recommend follow-up imaging to demonstrate complete resolution.  2.  Mild-moderate white matter disease with corpus callosum involvement, nonspecific. Diagnostic considerations include chronic microvascular changes, demyelination and gliosis.  3.  Numerous microhemorrhages throughout the brain which can reflect sequela of hypertensive microhemorrhage, amyloid angiopathy, or prior inflammation/infection.  - started no keppra   - continue q 8hrs neuro check   - f/u video EEG   - CT venogram f/u   - transfer to stroke unit in [place] under Dr. Alvarado's care   - avoid anticoagulation / antiplatelets     DIET: DASH  DVT prophylaxis: SCDs  CODE: full  DISPO: stepdown

## 2024-10-30 NOTE — PROGRESS NOTE ADULT - ASSESSMENT
54-year-old female with no significant past medical history comes to the hospital with first witnessed seizure.     seizure / HTN / R temporal lobe 2.3cm hematoma / diffuse microhemorrhages     - continue Keppra 1gm q12h   - continue Nifedipine   - continue VEEG   - transfer to [facility] as per neurology - for DSA   - check CT venogram

## 2024-10-30 NOTE — PROGRESS NOTE ADULT - ASSESSMENT
53 yo RHF w/ [facility] currently p/w new onset witnessed seizure with multiple areas of hypoattenuation on HCT suspect provoked seizure.  VEEG prelim with R PLEDs and MRI w/ subacute R temporal hematoma with recent CTA without evidence of medium-large vessel disease.  Has evidence of chronic microhemorrhages suspicious for vasculitis vs undiagnosed HTN. CTV pending to r/o sinus thrombosis given location of hematoma. On exam today, patient with some paraphrasic errors and visual disturbance.     55 yo RHF w/ [facility] currently p/w new onset witnessed seizure with multiple areas of hypoattenuation on HCT suspect provoked seizure.  VEEG prelim with R PLEDs and MRI w/ subacute R temporal hematoma with recent CTA without evidence of medium-large vessel disease.  Has evidence of chronic microhemorrhages suspicious for vasculitis vs undiagnosed HTN. CTV pending to r/o sinus thrombosis given location of hematoma. On exam today, patient with some paraphrasic errors and visual disturbance.      Recommendations  - Change neuro checks to q8hrs  - Maintain normotension   - Continue vEEG (discontinue for transport, and then rehook at [place])   - F/u CTV  - Speech Therapy/OT/PT Evaluation   - NeuroIR Consult (Dr Murry)   - SCD compression stocking  - No anti platelets/anti coagulants at this time   - LABS: ANCA, AHSAN, ESR/CRP, Beta 2 glycoprotein, MMA, homocysteine, thyroid autoantibodies, SSA/SSB, ACE level, Complement levels (CH50, C3, C4)   - Transfer to [place] for possible DSA. Admit to Dr Alvarado, 3E   - Primary team made aware of plan    Discussed with Dr Livingston attending    55 yo RHF w/ [facility] currently p/w new onset witnessed seizure with multiple areas of hypoattenuation on HCT suspect provoked seizure.  VEEG prelim with R PLEDs and MRI w/ subacute R temporal hematoma with recent CTA without evidence of medium-large vessel disease.  Has evidence of chronic microhemorrhages suspicious for vasculitis vs undiagnosed HTN. CTV pending to r/o sinus thrombosis given location of hematoma. On exam today, patient with some paraphrasic errors and visual disturbance.      Recommendations  - Change neuro checks to q8hrs  - Maintain normotension   - Continue vEEG (discontinue for transport, and then rehook at [place])   - F/u CTV and official report vEEG   - Speech Therapy/OT/PT Evaluation   - NeuroIR Consult (Dr Murry)   - SCD compression stocking  - No anti platelets/anti coagulants at this time   - LABS: ANCA, AHSAN, ESR/CRP, Beta 2 glycoprotein, MMA, homocysteine, thyroid autoantibodies, SSA/SSB, ACE level, Complement levels (CH50, C3, C4)   - Transfer to [place] for possible DSA. Admit to Dr Alvarado, 3E   - Primary team made aware of plan    Discussed with Dr Livingston attending    53 yo RHF w/ [facility] currently p/w new onset witnessed seizure with multiple areas of hypoattenuation on HCT suspect provoked seizure.  VEEG prelim with R PLEDs and MRI w/ subacute R temporal hematoma with recent CTA without evidence of medium-large vessel disease.  Has evidence of chronic microhemorrhages suspicious for vasculitis vs undiagnosed HTN. CTV pending to r/o sinus thrombosis given location of hematoma. On exam today, patient with some paraphrasic errors and visual disturbance.      Recommendations  - Change neuro checks to q8hrs  - Maintain normotension   - Continue vEEG (discontinue for transport, and then rehook at [place])   - Continue Keppra 1000mg bid  - F/u CTV and official report vEEG   - Speech Therapy/OT/PT Evaluation   - NeuroIR Consult (Dr Murry)   - SCD compression stocking  - No anti platelets/anti coagulants at this time   - LABS: ANCA, AHSAN, ESR/CRP, Beta 2 glycoprotein, MMA, homocysteine, thyroid autoantibodies, SSA/SSB, ACE level, Complement levels (CH50, C3, C4)   - Transfer to [place] for possible DSA. Admit to Dr Alvarado, 3E   - Primary team made aware of plan    Discussed with Dr Livingston attending    53 yo RHF w/ [facility] currently p/w new onset witnessed seizure with multiple areas of hypoattenuation on HCT suspect provoked seizure.  VEEG reports interictal activity; MRI w/ subacute R temporal hematoma with recent CTA without evidence of medium-large vessel disease.  Has evidence of chronic microhemorrhages suspicious for vasculitis vs undiagnosed HTN. CTV pending to r/o sinus thrombosis given location of hematoma. On exam today, patient with some paraphrasic errors and visual disturbance.      Recommendations  - Change neuro checks to q8hrs  - Maintain normotension   - Continue vEEG (discontinue for transport, and then rehook at [place])   - Continue Keppra 1000mg bid  - F/u CTV and official report vEEG   - Speech Therapy/OT/PT Evaluation   - NeuroIR Consult (Dr Murry)   - SCD compression stocking  - No anti platelets/anti coagulants at this time   - LABS: ANCA, AHSAN, ESR/CRP, Beta 2 glycoprotein, MMA, homocysteine, thyroid autoantibodies, SSA/SSB, ACE level, Complement levels (CH50, C3, C4)   - Transfer to [place] for possible DSA. Admit to Dr Alvarado, 3E   - Primary team made aware of plan    Discussed with Dr Livingston attending    53 yo RHF w/ [facility] currently p/w new onset witnessed seizure with multiple areas of hypoattenuation on HCT suspect provoked seizure.  VEEG reports interictal activity and right hemispheric, mainly temporal and central focal slowing; MRI w/ subacute R temporal hematoma with recent CTA without evidence of medium-large vessel disease.  Has evidence of chronic microhemorrhages suspicious for vasculitis vs undiagnosed HTN. CTV pending to r/o sinus thrombosis given location of hematoma. On exam today, patient with some paraphrasic errors and visual disturbance.      Recommendations  - Change neuro checks to q8hrs  - Maintain normotension   - Continue vEEG (discontinue for transport, and then rehook at [place])   - Continue Keppra 1000mg bid  - F/u CTV and official report vEEG   - Speech Therapy/OT/PT Evaluation   - NeuroIR Consult (Dr Murry)   - SCD compression stocking  - No anti platelets/anti coagulants at this time   - LABS: ANCA, AHSAN, ESR/CRP, Beta 2 glycoprotein, MMA, homocysteine, thyroid autoantibodies, SSA/SSB, ACE level, Complement levels (CH50, C3, C4)   - Transfer to [place] for possible DSA. Admit to Dr Alvarado, 3E   - Primary team made aware of plan    Discussed with Dr Livingston attending

## 2024-10-30 NOTE — PROGRESS NOTE ADULT - NS ATTEND AMEND GEN_ALL_CORE FT
55 yo RHF w/ [facility] currently p/w new onset witnessed GTC seizure with multiple areas of hypoattenuation on HCT suspect provoked seizure found with R-sided PLEDs likely secondary to subacute R temporal hematoma.  Has minimal risk factors for ICH but with evidence of chronic microhemorrhages possible vasculitis vs undiagnosed HTN.  Await CTV r/o sinus thrombosis given location of hematoma.  Discussed with neuroendovascular Dr. Murry and vascular neurology Dr. Bourgeois- plan for transfer to [address] under stroke service and possible diagnostic angiogram later this week.  In meantime await CTV, continue VEEG, keep normotensive and avoid anticoagulation/antiplts while awaiting transfer.  Discussed with ICU team.  Rest of recommendations as above.

## 2024-10-30 NOTE — CHART NOTE - NSCHARTNOTEFT_GEN_A_CORE
The neuroendovascular team was contacted due to the finding of late-subacute parenchymal hematoma within the right temporal lobe (~2.3cm) reported on MR head. Numerous micro hemorrhages, possibly representing amyloid angiopathy vs cavernomas, were also observed. The patient cannot be examined in person while at [facility]. A diagnostic cerebral angiogram may be considered should the patient transfer to [facility]. Radiographical imaging and clinical history were reviewed by Dr. Murry and the neuroendovascular ACP.   [phone] Neuroendovascular with questions/ concerns

## 2024-10-30 NOTE — PROGRESS NOTE ADULT - SUBJECTIVE AND OBJECTIVE BOX
Neurology Progress Note    Interval History:  The patient was seen and examined at the bedside. She notes when she was woken up around half an hour ago she felt like for a few minutes she was "talking stupid" again. In addition, during today's encounter she is noticing that after a person moves, she still feels as if she still sees their shadow in that space, or still sees them doing the same movement over again. She knows it is not real but can still see it. She denies having this experience with auditory input.     She adds that over the last week she has been noticing episodes of "talking stupid", lasting minutes at a time. She didn't think much of it initially. In addition, she denies any recent head trauma/falls.     History of Present Illness:  54-year-old white female with no significant past medical history comes to the hospital with first witnessed seizure as per witness patient was complaining of feeling tired woke up stared at the nila and had a generalized seizure and lost bladder function patient denies alcohol abuse or drug abuse or prior history of seizures, any fevers. in the emergency room blood patient's blood pressure was elevated and was seen and she was given IV labetalol.  No recurring seizure since  Pt is back to her base line.  Imaging in ER shows ischemic changes and white matter abnormalities. MRI recommended to r/o demyelinating disease. vs inflammatory process.   Neurology was consulted recommendations to admit to ICU stepdown with general neurochecks every 4 hours (29 Oct 2024 04:29)        PAST MEDICAL & SURGICAL HISTORY:  No pertinent past medical history            Medications:  chlorhexidine 2% Cloths 1 Application(s) Topical <User Schedule>  levETIRAcetam 1000 milliGRAM(s) Oral two times a day  lidocaine 2% Viscous 5 milliLiter(s) Swish and Spit three times a day PRN  LORazepam   Injectable 2 milliGRAM(s) IV Push once PRN  NIFEdipine XL 60 milliGRAM(s) Oral at bedtime  pantoprazole    Tablet 40 milliGRAM(s) Oral before breakfast      Vital Signs Last 24 Hrs  T(C): 36.3 (30 Oct 2024 07:10), Max: 37.1 (29 Oct 2024 15:05)  T(F): 97.3 (30 Oct 2024 07:10), Max: 98.8 (29 Oct 2024 23:25)  HR: 93 (30 Oct 2024 07:10) (83 - 100)  BP: 137/75 (30 Oct 2024 07:10) (125/56 - 193/107)  BP(mean): 99 (30 Oct 2024 07:10) (81 - 143)  RR: 16 (30 Oct 2024 07:10) (16 - 25)  SpO2: 93% (30 Oct 2024 07:10) (93% - 98%)    Parameters below as of 30 Oct 2024 04:33  Patient On (Oxygen Delivery Method): room air      GEN: NAD, pleasant, cooperative    NEURO:   MENTAL STATUS: AAOx3  LANG/SPEECH: Fluent, ++paraphrasic errors ulises with repetition ("No ands ifs or whats about it" instead of "No ands ifs or buts about it". Intact comprehension   CRANIAL NERVES:  II: Normal visual fields. After nurse passes in the hallway, she sees her shadow pass again right after   III, IV, VI: EOM intact, no gaze preference or deviation  V: normal  VII: no facial asymmetry  VIII: normal hearing to speech  MOTOR: 5/5 in both upper and lower extremities  REFLEXES:  downgoing toes  SENSORY: Normal to light touch in all extremities. No neglect.   COORD: Normal finger to nose and heel to shin, no tremor  Gait: deferred       Labs:  CBC Full  -  ( 29 Oct 2024 00:24 )  WBC Count : 13.59 K/uL  RBC Count : 5.14 M/uL  Hemoglobin : 14.4 g/dL  Hematocrit : 44.3 %  Platelet Count - Automated : 322 K/uL  Mean Cell Volume : 86.2 fL  Mean Cell Hemoglobin : 28.0 pg  Mean Cell Hemoglobin Concentration : 32.5 g/dL  Auto Neutrophil # : 11.99 K/uL  Auto Lymphocyte # : 1.00 K/uL  Auto Monocyte # : 0.48 K/uL  Auto Eosinophil # : 0.03 K/uL  Auto Basophil # : 0.05 K/uL  Auto Neutrophil % : 88.2 %  Auto Lymphocyte % : 7.4 %  Auto Monocyte % : 3.5 %  Auto Eosinophil % : 0.2 %  Auto Basophil % : 0.4 %    10-29    135  |  95[L]  |  14  ----------------------------<  141[H]  3.9   |  24  |  0.7    Ca    9.6      29 Oct 2024 00:24  Mg     2.2     10-29    TPro  7.9  /  Alb  4.5  /  TBili  0.4  /  DBili  x   /  AST  16  /  ALT  21  /  AlkPhos  125[H]  10-29    LIVER FUNCTIONS - ( 29 Oct 2024 00:24 )  Alb: 4.5 g/dL / Pro: 7.9 g/dL / ALK PHOS: 125 U/L / ALT: 21 U/L / AST: 16 U/L / GGT: x           PT/INR - ( 29 Oct 2024 00:25 )   PT: 11.60 sec;   INR: 1.02 ratio         PTT - ( 29 Oct 2024 00:25 )  PTT:33.6 sec  Urinalysis Basic - ( 29 Oct 2024 00:40 )    Color: Yellow / Appearance: Clear / S.012 / pH: x  Gluc: x / Ketone: Negative mg/dL  / Bili: Negative / Urobili: 0.2 mg/dL   Blood: x / Protein: 100 mg/dL / Nitrite: Negative   Leuk Esterase: Negative / RBC: 5 /HPF / WBC 5 /HPF   Sq Epi: x / Non Sq Epi: 3-5 /HPF / Bacteria: Few /HPF     Neurology Progress Note    Interval History:  The patient was seen and examined at the bedside. She notes when she was woken up around half an hour ago she felt like for a few minutes she was "talking stupid" again. In addition, during today's encounter she is noticing that after a person moves, she still feels as if she still sees their shadow in that space, or still sees them doing the same movement over again. She knows it is not real but can still see it. She denies having this experience with auditory input.     She adds that over the last week she has been noticing episodes of "talking stupid", lasting minutes at a time. She didn't think much of it initially. In addition, she denies any recent head trauma/falls. Last saw PCP 2 years ago. Had HTN during pregnancy, and while she had kidney stones, but at last PCP visit was told BP was normal. Never hospitalized for HTN. Some history of high lipids, but has not taken meds for it.     History of Present Illness:  54-year-old white female with no significant past medical history comes to the hospital with first witnessed seizure as per witness patient was complaining of feeling tired woke up stared at the nila and had a generalized seizure and lost bladder function patient denies alcohol abuse or drug abuse or prior history of seizures, any fevers. in the emergency room blood patient's blood pressure was elevated and was seen and she was given IV labetalol.  No recurring seizure since  Pt is back to her base line.  Imaging in ER shows ischemic changes and white matter abnormalities. MRI recommended to r/o demyelinating disease. vs inflammatory process.   Neurology was consulted recommendations to admit to ICU stepdown with general neurochecks every 4 hours (29 Oct 2024 04:29)        PAST MEDICAL & SURGICAL HISTORY:  high BP during pregnancy and during treatment for kidney stones    FAMILY HISTORY  No FHX early onset dementia/CVA. No FHx rheum conditions             Medications:  chlorhexidine 2% Cloths 1 Application(s) Topical <User Schedule>  levETIRAcetam 1000 milliGRAM(s) Oral two times a day  lidocaine 2% Viscous 5 milliLiter(s) Swish and Spit three times a day PRN  LORazepam   Injectable 2 milliGRAM(s) IV Push once PRN  NIFEdipine XL 60 milliGRAM(s) Oral at bedtime  pantoprazole    Tablet 40 milliGRAM(s) Oral before breakfast      Vital Signs Last 24 Hrs  T(C): 36.3 (30 Oct 2024 07:10), Max: 37.1 (29 Oct 2024 15:05)  T(F): 97.3 (30 Oct 2024 07:10), Max: 98.8 (29 Oct 2024 23:25)  HR: 93 (30 Oct 2024 07:10) (83 - 100)  BP: 137/75 (30 Oct 2024 07:10) (125/56 - 193/107)  BP(mean): 99 (30 Oct 2024 07:10) (81 - 143)  RR: 16 (30 Oct 2024 07:10) (16 - 25)  SpO2: 93% (30 Oct 2024 07:10) (93% - 98%)    Parameters below as of 30 Oct 2024 04:33  Patient On (Oxygen Delivery Method): room air      GEN: NAD, pleasant, cooperative    NEURO:   MENTAL STATUS: AAOx3. Able to follow two step commands  LANG/SPEECH: Fluent, ++paraphrasic errors ulises with repetition ("No ands ifs or whats about it" instead of "No ands ifs or buts about it". Intact comprehension   CRANIAL NERVES:  II: Normal visual fields. After nurse passes in the hallway, she sees her shadow pass again right after   III, IV, VI: EOM intact, no gaze preference or deviation  V: normal  VII: no facial asymmetry  VIII: normal hearing to speech  MOTOR: 5/5 in both upper and lower extremities  REFLEXES:  downgoing toes  SENSORY: Questionable altered sensation in left arm v right arm to light touch ("feels like a pencil is touching left, and a hand is touching right"). Normal in LEs. No neglect   COORD: Normal finger to nose and heel to shin, no tremor  Gait: deferred       Labs:  CBC Full  -  ( 29 Oct 2024 00:24 )  WBC Count : 13.59 K/uL  RBC Count : 5.14 M/uL  Hemoglobin : 14.4 g/dL  Hematocrit : 44.3 %  Platelet Count - Automated : 322 K/uL  Mean Cell Volume : 86.2 fL  Mean Cell Hemoglobin : 28.0 pg  Mean Cell Hemoglobin Concentration : 32.5 g/dL  Auto Neutrophil # : 11.99 K/uL  Auto Lymphocyte # : 1.00 K/uL  Auto Monocyte # : 0.48 K/uL  Auto Eosinophil # : 0.03 K/uL  Auto Basophil # : 0.05 K/uL  Auto Neutrophil % : 88.2 %  Auto Lymphocyte % : 7.4 %  Auto Monocyte % : 3.5 %  Auto Eosinophil % : 0.2 %  Auto Basophil % : 0.4 %    10-29    135  |  95[L]  |  14  ----------------------------<  141[H]  3.9   |  24  |  0.7    Ca    9.6      29 Oct 2024 00:24  Mg     2.2     10-29    TPro  7.9  /  Alb  4.5  /  TBili  0.4  /  DBili  x   /  AST  16  /  ALT  21  /  AlkPhos  125[H]  10-29    LIVER FUNCTIONS - ( 29 Oct 2024 00:24 )  Alb: 4.5 g/dL / Pro: 7.9 g/dL / ALK PHOS: 125 U/L / ALT: 21 U/L / AST: 16 U/L / GGT: x           PT/INR - ( 29 Oct 2024 00:25 )   PT: 11.60 sec;   INR: 1.02 ratio         PTT - ( 29 Oct 2024 00:25 )  PTT:33.6 sec  Urinalysis Basic - ( 29 Oct 2024 00:40 )    Color: Yellow / Appearance: Clear / S.012 / pH: x  Gluc: x / Ketone: Negative mg/dL  / Bili: Negative / Urobili: 0.2 mg/dL   Blood: x / Protein: 100 mg/dL / Nitrite: Negative   Leuk Esterase: Negative / RBC: 5 /HPF / WBC 5 /HPF   Sq Epi: x / Non Sq Epi: 3-5 /HPF / Bacteria: Few /HPF     Neurology Progress Note    Interval History:  The patient was seen and examined at the bedside. She notes when she was woken up around half an hour ago she felt like for a few minutes she was "talking stupid" again. In addition, during today's encounter she is noticing that after a person moves, she still feels as if she still sees their shadow in that space, or still sees them doing the same movement over again. She knows it is not real but can still see it. She denies having this experience with auditory input.     She adds that over the last week she has been noticing episodes of "talking stupid", lasting minutes at a time. She didn't think much of it initially. In addition, she denies any recent head trauma/falls. Last saw PCP 2 years ago. Had HTN during pregnancy, and while she had kidney stones, but at last PCP visit was told BP was normal. Never hospitalized for HTN. Some history of high lipids, but has not taken meds for it.     History of Present Illness:  54-year-old white female with no significant past medical history comes to the hospital with first witnessed seizure as per witness patient was complaining of feeling tired woke up stared at the nila and had a generalized seizure and lost bladder function patient denies alcohol abuse or drug abuse or prior history of seizures, any fevers. in the emergency room blood patient's blood pressure was elevated and was seen and she was given IV labetalol.  No recurring seizure since  Pt is back to her base line.  Imaging in ER shows ischemic changes and white matter abnormalities. MRI recommended to r/o demyelinating disease. vs inflammatory process.   Neurology was consulted recommendations to admit to ICU stepdown with general neurochecks every 4 hours (29 Oct 2024 04:29)        PAST MEDICAL & SURGICAL HISTORY:  high BP during pregnancy and during treatment for kidney stones    FAMILY HISTORY  No FHX early onset dementia/CVA. No FHx rheum conditions             Medications:  chlorhexidine 2% Cloths 1 Application(s) Topical <User Schedule>  levETIRAcetam 1000 milliGRAM(s) Oral two times a day  lidocaine 2% Viscous 5 milliLiter(s) Swish and Spit three times a day PRN  LORazepam   Injectable 2 milliGRAM(s) IV Push once PRN  NIFEdipine XL 60 milliGRAM(s) Oral at bedtime  pantoprazole    Tablet 40 milliGRAM(s) Oral before breakfast      Vital Signs Last 24 Hrs  T(C): 36.3 (30 Oct 2024 07:10), Max: 37.1 (29 Oct 2024 15:05)  T(F): 97.3 (30 Oct 2024 07:10), Max: 98.8 (29 Oct 2024 23:25)  HR: 93 (30 Oct 2024 07:10) (83 - 100)  BP: 137/75 (30 Oct 2024 07:10) (125/56 - 193/107)  BP(mean): 99 (30 Oct 2024 07:10) (81 - 143)  RR: 16 (30 Oct 2024 07:10) (16 - 25)  SpO2: 93% (30 Oct 2024 07:10) (93% - 98%)    Parameters below as of 30 Oct 2024 04:33  Patient On (Oxygen Delivery Method): room air      GEN: NAD, pleasant, cooperative    NEURO:   MENTAL STATUS: AAOx3. Able to follow two step commands  LANG/SPEECH: Fluent, ++paraphrasic errors ulises with repetition ("No ands ifs or whats about it" instead of "No ands ifs or buts about it". Intact comprehension   CRANIAL NERVES:  II: Normal visual fields. After nurse passes in the hallway, she sees her shadow pass again right after   III, IV, VI: EOM intact, no gaze preference or deviation  V: normal  VII: no facial asymmetry  VIII: normal hearing to speech  MOTOR: 5/5 in both upper and lower extremities  REFLEXES:  downgoing toes  SENSORY: Questionable altered sensation in left arm v right arm to light touch ("feels like a pencil is touching left, and a hand is touching right"). Normal in LEs. No neglect   COORD: Normal finger to nose and heel to shin, no tremor  Gait: deferred       Labs:  CBC Full  -  ( 29 Oct 2024 00:24 )  WBC Count : 13.59 K/uL  RBC Count : 5.14 M/uL  Hemoglobin : 14.4 g/dL  Hematocrit : 44.3 %  Platelet Count - Automated : 322 K/uL  Mean Cell Volume : 86.2 fL  Mean Cell Hemoglobin : 28.0 pg  Mean Cell Hemoglobin Concentration : 32.5 g/dL  Auto Neutrophil # : 11.99 K/uL  Auto Lymphocyte # : 1.00 K/uL  Auto Monocyte # : 0.48 K/uL  Auto Eosinophil # : 0.03 K/uL  Auto Basophil # : 0.05 K/uL  Auto Neutrophil % : 88.2 %  Auto Lymphocyte % : 7.4 %  Auto Monocyte % : 3.5 %  Auto Eosinophil % : 0.2 %  Auto Basophil % : 0.4 %    10-29    135  |  95[L]  |  14  ----------------------------<  141[H]  3.9   |  24  |  0.7    Ca    9.6      29 Oct 2024 00:24  Mg     2.2     10-29    TPro  7.9  /  Alb  4.5  /  TBili  0.4  /  DBili  x   /  AST  16  /  ALT  21  /  AlkPhos  125[H]  10-29    LIVER FUNCTIONS - ( 29 Oct 2024 00:24 )  Alb: 4.5 g/dL / Pro: 7.9 g/dL / ALK PHOS: 125 U/L / ALT: 21 U/L / AST: 16 U/L / GGT: x           PT/INR - ( 29 Oct 2024 00:25 )   PT: 11.60 sec;   INR: 1.02 ratio         PTT - ( 29 Oct 2024 00:25 )  PTT:33.6 sec  Urinalysis Basic - ( 29 Oct 2024 00:40 )    Color: Yellow / Appearance: Clear / S.012 / pH: x  Gluc: x / Ketone: Negative mg/dL  / Bili: Negative / Urobili: 0.2 mg/dL   Blood: x / Protein: 100 mg/dL / Nitrite: Negative   Leuk Esterase: Negative / RBC: 5 /HPF / WBC 5 /HPF   Sq Epi: x / Non Sq Epi: 3-5 /HPF / Bacteria: Few /HPF      TESTING        VEEG in the last 24 hours:    Background - continuous, slightly asymmetrical with better expression of the PDR from the left side, reaching frequencies in the range of 8-9 Hz superimposed by small amount of lower range low amplitude theta    Focal and generalized slowin. borderline generalized slowing  2. mild to moderate right hemispheric, mainly temporal and central focal slowing    Interictal activity - frequent right FT sharp waves and spikes that for the most part presented in periodic pattern    Events - none    Seizures - none    Impression: Abnormal VEEG as above

## 2024-10-30 NOTE — PROGRESS NOTE ADULT - SUBJECTIVE AND OBJECTIVE BOX
Patient is a 54y old  Female who presents with a chief complaint of s/p generalized seizure (30 Oct 2024 08:30)      T(F): 97.3 (10-30-24 @ 07:10), Max: 98.8 (10-29-24 @ 23:25)  HR: 93 (10-30-24 @ 07:10)  BP: 137/75 (10-30-24 @ 07:10)  RR: 16 (10-30-24 @ 07:10)  SpO2: 93% (10-30-24 @ 07:10) (93% - 98%)    PHYSICAL EXAM:  GENERAL: NAD  HEAD:  Atraumatic, Normocephalic  EYES: EOMI, PERRLA, conjunctiva and sclera clear  NERVOUS SYSTEM:  Alert & Oriented X3, no focal deficits   CHEST/LUNG: Clear to percussion bilaterally; No rales, rhonchi, wheezing, or rubs  HEART: Regular rate and rhythm; No murmurs, rubs, or gallops  ABDOMEN: Soft, Nontender, Nondistended; Bowel sounds present  EXTREMITIES:  2+ Peripheral Pulses, No clubbing, cyanosis, or edema    LABS  10-29    135  |  95[L]  |  14  ----------------------------<  141[H]  3.9   |  24  |  0.7    Ca    9.6      29 Oct 2024 00:24  Mg     2.2     10-29    TPro  7.9  /  Alb  4.5  /  TBili  0.4  /  DBili  x   /  AST  16  /  ALT  21  /  AlkPhos  125[H]  10-29                          14.4   13.59 )-----------( 322      ( 29 Oct 2024 00:24 )             44.3     PT/INR - ( 29 Oct 2024 00:25 )   PT: 11.60 sec;   INR: 1.02 ratio         PTT - ( 29 Oct 2024 00:25 )  PTT:33.6 sec    RADIOLOGY  < from: MR Head w/ IV Cont (10.29.24 @ 16:25) >  IMPRESSION:    1.  Late-subacute parenchymal hematoma within the right temporal lobe   measuring about 2.3 cm with mild surrounding edema. No nodular or   masslike enhancement but recommend follow-up imaging to demonstrate   complete resolution.    2.  Mild-moderate white matter disease with corpus callosum involvement,   nonspecific. Diagnostic considerations include chronic microvascular   changes, demyelination and gliosis.    3.  Numerous microhemorrhages throughout the brain which can reflect   sequela of hypertensive microhemorrhage, amyloid angiopathy, or prior   inflammation/infection.    < end of copied text >    MEDICATIONS  (STANDING):  levETIRAcetam 1000 milliGRAM(s) Oral two times a day  NIFEdipine XL 60 milliGRAM(s) Oral at bedtime  pantoprazole    Tablet 40 milliGRAM(s) Oral before breakfast    MEDICATIONS  (PRN):  lidocaine 2% Viscous 5 milliLiter(s) Swish and Spit three times a day PRN Mouth Care  LORazepam   Injectable 2 milliGRAM(s) IV Push once PRN Seizure Activity      Patient seen and evaluated this am, no complaints, did have some visual disturbance this am, now resolved       T(F): 97.3 (10-30-24 @ 07:10), Max: 98.8 (10-29-24 @ 23:25)  HR: 93 (10-30-24 @ 07:10)  BP: 137/75 (10-30-24 @ 07:10)  RR: 16 (10-30-24 @ 07:10)  SpO2: 93% (10-30-24 @ 07:10) (93% - 98%)    PHYSICAL EXAM:  GENERAL: NAD  HEAD:  Atraumatic, Normocephalic  EYES: EOMI, PERRLA, conjunctiva and sclera clear  NERVOUS SYSTEM:  Alert & Oriented X3, no focal motor deficits   CHEST/LUNG: Clear to percussion bilaterally; No rales, rhonchi, wheezing, or rubs  HEART: Regular rate and rhythm; No murmurs, rubs, or gallops  ABDOMEN: Soft, Nontender, Nondistended; Bowel sounds present  EXTREMITIES:  2+ Peripheral Pulses, No clubbing, cyanosis, or edema    LABS  10-29    135  |  95[L]  |  14  ----------------------------<  141[H]  3.9   |  24  |  0.7    Ca    9.6      29 Oct 2024 00:24  Mg     2.2     10-29    TPro  7.9  /  Alb  4.5  /  TBili  0.4  /  DBili  x   /  AST  16  /  ALT  21  /  AlkPhos  125[H]  10-29                          14.4   13.59 )-----------( 322      ( 29 Oct 2024 00:24 )             44.3     PT/INR - ( 29 Oct 2024 00:25 )   PT: 11.60 sec;   INR: 1.02 ratio         PTT - ( 29 Oct 2024 00:25 )  PTT:33.6 sec    RADIOLOGY  < from: MR Head w/ IV Cont (10.29.24 @ 16:25) >  IMPRESSION:    1.  Late-subacute parenchymal hematoma within the right temporal lobe   measuring about 2.3 cm with mild surrounding edema. No nodular or   masslike enhancement but recommend follow-up imaging to demonstrate   complete resolution.    2.  Mild-moderate white matter disease with corpus callosum involvement,   nonspecific. Diagnostic considerations include chronic microvascular   changes, demyelination and gliosis.    3.  Numerous microhemorrhages throughout the brain which can reflect   sequela of hypertensive microhemorrhage, amyloid angiopathy, or prior   inflammation/infection.    < end of copied text >    MEDICATIONS  (STANDING):  levETIRAcetam 1000 milliGRAM(s) Oral two times a day  NIFEdipine XL 60 milliGRAM(s) Oral at bedtime      MEDICATIONS  (PRN):  lidocaine 2% Viscous 5 milliLiter(s) Swish and Spit three times a day PRN Mouth Care  LORazepam   Injectable 2 milliGRAM(s) IV Push once PRN Seizure Activity

## 2024-10-31 PROCEDURE — 99232 SBSQ HOSP IP/OBS MODERATE 35: CPT

## 2024-10-31 PROCEDURE — 99233 SBSQ HOSP IP/OBS HIGH 50: CPT

## 2024-10-31 RX ORDER — SODIUM CHLORIDE 9 MG/ML
1000 INJECTION, SOLUTION INTRAMUSCULAR; INTRAVENOUS; SUBCUTANEOUS
Refills: 0 | Status: DISCONTINUED | OUTPATIENT
Start: 2024-10-31 | End: 2024-11-04

## 2024-10-31 RX ADMIN — Medication 60 MILLIGRAM(S): at 21:50

## 2024-10-31 RX ADMIN — LEVETIRACETAM 1000 MILLIGRAM(S): 500 TABLET, FILM COATED ORAL at 05:22

## 2024-10-31 RX ADMIN — LEVETIRACETAM 1000 MILLIGRAM(S): 500 TABLET, FILM COATED ORAL at 17:31

## 2024-10-31 NOTE — OCCUPATIONAL THERAPY INITIAL EVALUATION ADULT - NSOTDISCHREC_GEN_A_CORE
one followup OT session recommended to address higher functioning tasks and any underlying issues prior to safely and independently dc./No skilled OT needs

## 2024-10-31 NOTE — OCCUPATIONAL THERAPY INITIAL EVALUATION ADULT - IADL RETRAINING, OT EVAL
Pt will perform meal prep task in standing with independent by dc. Pt will perform laundry task in standing with independent by dc.

## 2024-10-31 NOTE — OCCUPATIONAL THERAPY INITIAL EVALUATION ADULT - PLANNED THERAPY INTERVENTIONS, OT EVAL
ADL retraining/IADL retraining/motor coordination training/neuromuscular re-education/ROM/strengthening/stretching/transfer training

## 2024-10-31 NOTE — CHART NOTE - NSCHARTNOTEFT_GEN_A_CORE
Patient refused telemetry monitoring with RN, Kelsey Beauchamp. Patient received education on the indication and importance of telemetry monitoring. Will discuss with patient again.

## 2024-10-31 NOTE — PROGRESS NOTE ADULT - SUBJECTIVE AND OBJECTIVE BOX
Patient is a 54y old  Female who presents with a chief complaint of GTCS, R temporal hematoma (31 Oct 2024 07:33)    HPI:  54-year-old white female with no significant past medical history comes to the hospital with first witnessed seizure as per witness patient was complaining of feeling tired woke up stared at the nila and had a generalized seizure and lost bladder function patient denies alcohol abuse or drug abuse or prior history of seizures, any fevers. in the emergency room blood patient's blood pressure was elevated and was seen and she was given IV labetalol.  No recurring seizure since  Pt is back to her base line.  Imaging in ER shows ischemic changes and white matter abnormalities. MRI recommended to r/o demyelinating disease. vs inflammatory process.   Neurology was consulted recommendations to admit to ICU stepdown with general neurochecks every 4 hours (29 Oct 2024 04:29)    PAST MEDICAL & SURGICAL HISTORY:  No pertinent past medical history      patient seen and examined independently on morning rounds for the first time today, chart reviewed and discussed with the neurology team:   hospital day #2  transferred overnight from [facility] to [facility] for possible DSA and additional w/u  currently on VEEG  endorses her great grandmother had h/o spells (could be ? abscence seizures)    Vital Signs Last 24 Hrs  T(C): 36.9 (31 Oct 2024 13:37), Max: 37 (31 Oct 2024 05:01)  T(F): 98.4 (31 Oct 2024 13:37), Max: 98.6 (31 Oct 2024 05:01)  HR: 97 (31 Oct 2024 13:37) (75 - 105)  BP: 152/88 (31 Oct 2024 13:37) (113/74 - 168/83)  BP(mean): 87 (31 Oct 2024 05:01) (87 - 111)  RR: 18 (31 Oct 2024 13:37) (18 - 34)  SpO2: 98% (31 Oct 2024 13:37) (94% - 100%)    Parameters below as of 31 Oct 2024 13:37  Patient On (Oxygen Delivery Method): room air               PE:  GEN-NAD, AAOx3, on VEEG, resting comfortably  HEENT- NCAT, PERRLA, EOMI  PULM- Clear to auscultation bilaterally, fair air entry  CVS- +s1/s2 RRR no murmurs  GI- soft NT ND +bs, no rebound, no guarding  EXT- no edema               13.9   10.18 )-----------( 308      ( 30 Oct 2024 11:18 )             42.3     10-30    141  |  104  |  10  ----------------------------<  91  3.5   |  23  |  0.7    Ca    9.4      30 Oct 2024 11:18  Phos  3.3     10-30  Mg     2.1     10-30    TPro  7.2  /  Alb  3.9  /  TBili  0.8  /  DBili  x   /  AST  16  /  ALT  18  /  AlkPhos  105  10-30        Urinalysis Basic - ( 30 Oct 2024 11:18 )    Color: x / Appearance: x / SG: x / pH: x  Gluc: 91 mg/dL / Ketone: x  / Bili: x / Urobili: x   Blood: x / Protein: x / Nitrite: x   Leuk Esterase: x / RBC: x / WBC x   Sq Epi: x / Non Sq Epi: x / Bacteria: x        Urinalysis with Rflx Culture (collected 29 Oct 2024 00:40)        MEDICATIONS  (STANDING):  levETIRAcetam 1000 milliGRAM(s) Oral two times a day  NIFEdipine XL 60 milliGRAM(s) Oral at bedtime

## 2024-10-31 NOTE — SWALLOW BEDSIDE ASSESSMENT ADULT - SWALLOW EVAL: DIAGNOSIS
Toleration observed for regular solids with thin liquids without overt s/s of penetration/aspiration.

## 2024-10-31 NOTE — PROGRESS NOTE ADULT - SUBJECTIVE AND OBJECTIVE BOX
--------Stroke Progress Note--------    INTERVAL HPI / OVERNIGHT EVENTS:  Overnight, no acute events.  Today, patient was seen and examined.     Vital Signs Last 24 Hrs  T(C): 37 (31 Oct 2024 05:01), Max: 37 (31 Oct 2024 05:01)  T(F): 98.6 (31 Oct 2024 05:01), Max: 98.6 (31 Oct 2024 05:01)  HR: 100 (31 Oct 2024 05:) (75 - 105)  BP: 113/74 (31 Oct 2024 05:) (113/74 - 168/83)  BP(mean): 87 (31 Oct 2024 05:) (87 - 111)  RR: 20 (31 Oct 2024 05:) (18 - 34)  SpO2: 94% (31 Oct 2024 05:) (94% - 100%)    Parameters below as of 31 Oct 2024 05:  Patient On (Oxygen Delivery Method): room air      Physical exam:  General: No acute distress, awake and alert  Eyes: Anicteric sclerae, moist conjunctivae, see below for CNs  Neck: trachea midline, FROM, supple, no thyromegaly or lymphadenopathy  Cardiovascular: Regular rate and rhythm, no murmurs, rubs, or gallops. No carotid bruits.   Pulmonary: Anterior breath sounds clear bilaterally, no crackles or wheezing. No use of accessory muscles  GI: Abdomen soft, non-distended, non-tender  Extremities: Radial and DP pulses +2, no edema    Neurological:  MENTAL STATUS: AAOx3. Able to follow two step commands  LANG/SPEECH: Fluent, ++paraphrasic errors ulises with repetition, Intact comprehension   CRANIAL NERVES:  II: Normal visual fields. After nurse passes in the hallway, she sees her shadow pass again right after   III, IV, VI: EOM intact, no gaze preference or deviation  V: normal  VII: no facial asymmetry  VIII: normal hearing to speech  MOTOR: 5/5 in both upper and lower extremities  REFLEXES:  downgoing toes  SENSORY: decreased sensation to pin prick in left arm vs right arm Normal in LEs. No neglect   COORD: Normal finger to nose and heel to shin, no tremor  Gait: deferred due to patient safety    NIHSS: 1 (mild aphasia)    LABS:                        13.9   10.18 )-----------( 308      ( 30 Oct 2024 11:18 )             42.3     10-30    141  |  104  |  10  ----------------------------<  91  3.5   |  23  |  0.7    Ca    9.4      30 Oct 2024 11:18  Phos  3.3     10-30  Mg     2.1     10-30    TPro  7.2  /  Alb  3.9  /  TBili  0.8  /  DBili  x   /  AST  16  /  ALT  18  /  AlkPhos  105  10      Urinalysis Basic - ( 30 Oct 2024 11:18 )    Color: x / Appearance: x / SG: x / pH: x  Gluc: 91 mg/dL / Ketone: x  / Bili: x / Urobili: x   Blood: x / Protein: x / Nitrite: x   Leuk Esterase: x / RBC: x / WBC x   Sq Epi: x / Non Sq Epi: x / Bacteria: x        MEDICATIONS  (STANDING):  levETIRAcetam 1000 milliGRAM(s) Oral two times a day  NIFEdipine XL 60 milliGRAM(s) Oral at bedtime    MEDICATIONS  (PRN):  lidocaine 2% Viscous 5 milliLiter(s) Swish and Spit three times a day PRN Mouth Care  LORazepam   Injectable 2 milliGRAM(s) IV Push once PRN Seizure Activity    Allergies    No Known Allergies    Intolerances        RADIOLOGY & ADDITIONAL TESTS:    CTH 10/28:   Decreased attenuation of periventricular and subcortical white matter, nonspecific. Considerations include inflammatory and demyelinating diseases such as multiple sclerosis.   Confluent regional decreased attenuation at right temporal lobe with loss of gray-white junction, which could represent area of early ischemic change. Further evaluation recommended with MR brain with IV contrast.  Chronic ischemic change to left occipital lobe.    CTA 10/29: No large vessel occlusion, aneurysm, or vascular malformation. Scattered areas of diminished density, white matter lesions, better depicted CT scan .    MR head 10/29:  1.  Late-subacute parenchymal hematoma within the right temporal lobe measuring about 2.3 cm with mild surrounding edema. No nodular or   masslike enhancement but recommend follow-up imaging to demonstrate complete resolution.  2.  Mild-moderate white matter disease with corpus callosum involvement, nonspecific. Diagnostic considerations include chronic microvascular changes, demyelination and gliosis.  3.  Numerous microhemorrhages throughout the brain which can reflect   sequela of hypertensive microhemorrhage, amyloid angiopathy, or prior inflammation/infection    VEEG in the last 24 hours 10/30:    Background - continuous, slightly asymmetrical with better expression of the PDR from the left side, reaching frequencies in the range of 8-9 Hz superimposed by small amount of lower range low amplitude theta    Focal and generalized slowin. borderline generalized slowing  2. mild to moderate right hemispheric, mainly temporal and central focal slowing    Interictal activity - frequent right FT sharp waves and spikes that for the most part presented in periodic pattern     --------Stroke Progress Note--------    INTERVAL HPI / OVERNIGHT EVENTS:  Overnight, no acute events.  Today, patient was seen and examined.     Vital Signs Last 24 Hrs  T(C): 37 (31 Oct 2024 05:01), Max: 37 (31 Oct 2024 05:01)  T(F): 98.6 (31 Oct 2024 05:01), Max: 98.6 (31 Oct 2024 05:01)  HR: 100 (31 Oct 2024 05:) (75 - 105)  BP: 113/74 (31 Oct 2024 05:) (113/74 - 168/83)  BP(mean): 87 (31 Oct 2024 05:) (87 - 111)  RR: 20 (31 Oct 2024 05:) (18 - 34)  SpO2: 94% (31 Oct 2024 05:) (94% - 100%)    Parameters below as of 31 Oct 2024 05:  Patient On (Oxygen Delivery Method): room air      Physical exam:  General: No acute distress, awake and alert  Eyes: Anicteric sclerae, moist conjunctivae, see below for CNs  Neck: trachea midline, FROM, supple, no thyromegaly or lymphadenopathy  Cardiovascular: Regular rate and rhythm, no murmurs, rubs, or gallops. No carotid bruits.   Pulmonary: Anterior breath sounds clear bilaterally, no crackles or wheezing. No use of accessory muscles  GI: Abdomen soft, non-distended, non-tender  Extremities: Radial and DP pulses +2, no edema    Neurological:  MENTAL STATUS: AAOx3. Able to follow two step commands  LANG/SPEECH: Fluent, repetition intact, Intact comprehension, no dysarthria  CRANIAL NERVES:  II: Normal visual fields. No visual disturbance  III, IV, VI: EOM intact, no gaze preference or deviation  V: normal  VII: no facial asymmetry  VIII: normal hearing to speech  MOTOR: 5/5 in both upper and lower extremities  REFLEXES:  downgoing toes  SENSORY: decreased sensation to pin prick in left arm vs right arm Normal in LEs. No neglect   COORD: Normal finger to nose and heel to shin, no tremor  Gait: deferred due to patient safety    NIHSS: 0    LABS:                        13.9   10.18 )-----------( 308      ( 30 Oct 2024 11:18 )             42.3     10-30    141  |  104  |  10  ----------------------------<  91  3.5   |  23  |  0.7    Ca    9.4      30 Oct 2024 11:18  Phos  3.3     10-30  Mg     2.1     10-30    TPro  7.2  /  Alb  3.9  /  TBili  0.8  /  DBili  x   /  AST  16  /  ALT  18  /  AlkPhos  105  10-30      Urinalysis Basic - ( 30 Oct 2024 11:18 )    Color: x / Appearance: x / SG: x / pH: x  Gluc: 91 mg/dL / Ketone: x  / Bili: x / Urobili: x   Blood: x / Protein: x / Nitrite: x   Leuk Esterase: x / RBC: x / WBC x   Sq Epi: x / Non Sq Epi: x / Bacteria: x        MEDICATIONS  (STANDING):  levETIRAcetam 1000 milliGRAM(s) Oral two times a day  NIFEdipine XL 60 milliGRAM(s) Oral at bedtime    MEDICATIONS  (PRN):  lidocaine 2% Viscous 5 milliLiter(s) Swish and Spit three times a day PRN Mouth Care  LORazepam   Injectable 2 milliGRAM(s) IV Push once PRN Seizure Activity    Allergies    No Known Allergies    Intolerances        RADIOLOGY & ADDITIONAL TESTS:    CTH 10/28:   Decreased attenuation of periventricular and subcortical white matter, nonspecific. Considerations include inflammatory and demyelinating diseases such as multiple sclerosis.   Confluent regional decreased attenuation at right temporal lobe with loss of gray-white junction, which could represent area of early ischemic change. Further evaluation recommended with MR brain with IV contrast.  Chronic ischemic change to left occipital lobe.    CTA 10/29: No large vessel occlusion, aneurysm, or vascular malformation. Scattered areas of diminished density, white matter lesions, better depicted CT scan .    MR head 10/29:  1.  Late-subacute parenchymal hematoma within the right temporal lobe measuring about 2.3 cm with mild surrounding edema. No nodular or   masslike enhancement but recommend follow-up imaging to demonstrate complete resolution.  2.  Mild-moderate white matter disease with corpus callosum involvement, nonspecific. Diagnostic considerations include chronic microvascular changes, demyelination and gliosis.  3.  Numerous microhemorrhages throughout the brain which can reflect   sequela of hypertensive microhemorrhage, amyloid angiopathy, or prior inflammation/infection    VEEG in the last 24 hours 10/30:    Background - continuous, slightly asymmetrical with better expression of the PDR from the left side, reaching frequencies in the range of 8-9 Hz superimposed by small amount of lower range low amplitude theta    Focal and generalized slowin. borderline generalized slowing  2. mild to moderate right hemispheric, mainly temporal and central focal slowing    Interictal activity - frequent right FT sharp waves and spikes that for the most part presented in periodic pattern

## 2024-10-31 NOTE — OCCUPATIONAL THERAPY INITIAL EVALUATION ADULT - GENERAL OBSERVATIONS, REHAB EVAL
Pt encountered prone position in bed in NAD +IV locked +vEEG. Pt agreed to OT eval with no reported any discomfort/pain/dizziness throughout, vision issues resolved. Pt left where she was found in [facility], [place] agosto.

## 2024-10-31 NOTE — OCCUPATIONAL THERAPY INITIAL EVALUATION ADULT - LIVES WITH, PROFILE
Pt lives with family in a  with steps to enter and 1FOS to bedroom/bathroom. +bath tub with no grab bars and no shower chair/children

## 2024-10-31 NOTE — PROGRESS NOTE ADULT - ASSESSMENT
55 yo right-handed female w/ HTN in pregnancy, HLD, p/w new onset witnessed GTCS with multiple areas of hypoattenuation on CT suspect provoked seizure. vEEG with R PLEDs and MRI w/ subacute R temporal hematoma with recent CTA w/o LVO. Has evidence of chronic microhemorrhages suspicious for vasculitis vs undiagnosed HTN. CTV pending to r/o sinus thrombosis given location of hematoma. On exam, patient with some paraphrasic errors and visual disturbance. Will discuss possible DSA with BRYAN pending CTV results. Continue vEEG for now.     #subacute R temporal hematoma  - Change neuro checks to q8hrs  - Maintain normotension, c/w nifedipine 60mg qHS  - f/u CT venogram report  - Speech Therapy/OT/PT Evaluation   - f/u neuroIR (Dr. Murry)   - No anti-platelets/anti coagulants at this time   - f/u LABS: ANCA, AHSAN, ESR/CRP, Beta 2 glycoprotein, MMA, homocysteine, thyroid autoantibodies, SSA/SSB, ACE level, Complement levels (CH50, C3, C4)     #new onset GTCS likely i/s/o R. temporal hematoma  - continue Keppra 1g q12h  - seizure precautions  - continue vEEG    #HLD  - obtain lipid panel    #?HTN  In the ED, BP was elevated and was seen and she was given IV labetalol   - BP goal: normotension  - c/w nifedipine 60mg qHS    #Misc  F: NI  E: replete K<4, Mg<2  N: DASH  VTE Prophylaxis: SCDs  GI: NI  Activity: IAT  C: Full Code  D: Neurology Remote Tele       55 yo right-handed female w/ HTN in pregnancy, HLD, p/w new onset witnessed GTCS with multiple areas of hypoattenuation on CT suspect provoked seizure. vEEG with R PLEDs and MRI w/ subacute R temporal hematoma with recent CTA w/o LVO. Has evidence of chronic microhemorrhages suspicious for vasculitis vs undiagnosed HTN. CTV pending to r/o sinus thrombosis given location of hematoma. On exam, patient with some paraphrasic errors and visual disturbance. Will discuss possible DSA with BRYAN pending CTV results. Continue vEEG for now.     #subacute R temporal hematoma  - Change neuro checks to q8hrs  - Maintain normotension, c/w nifedipine 60mg qHS  - f/u CT venogram report  - Speech Therapy/OT/PT Evaluation   - f/u neuroIR (Dr. Murry)   - No anti-platelets/anti coagulants at this time   - f/u LABS: ANCA, AHSAN, ESR/CRP, Beta 2 glycoprotein, MMA, homocysteine, thyroid autoantibodies, SSA/SSB, ACE level, Complement levels (CH50, C3, C4)     #new onset GTCS likely i/s/o R. temporal hematoma  - continue Keppra 1g q12h  - seizure precautions  - continue vEEG    #HLD  - obtain lipid panel    #HTN  In the ED, BP was elevated and was seen and she was given IV labetalol   - BP goal: normotension  - c/w nifedipine 60mg qHS    #Misc  F: NI  E: replete K<4, Mg<2  N: DASH  VTE Prophylaxis: SCDs  GI: NI  Activity: IAT  C: Full Code  D: Neurology Remote Tele

## 2024-10-31 NOTE — SWALLOW BEDSIDE ASSESSMENT ADULT - SLP PERTINENT HISTORY OF CURRENT PROBLEM
53 yo right-handed female w/ HTN in pregnancy, HLD, p/w new onset witnessed GTCS with multiple areas of hypoattenuation on CT suspect provoked seizure. MRI showed subacute R temporal hematoma with recent CTA w/o LVO. Has evidence of chronic micro hemorrhages suspicious for vasculitis vs undiagnosed HTN. CTV pending to r/o sinus thrombosis given location of hematoma. Possible plan for DSA with BRYAN pending CTV results. Continue vEEG for now.

## 2024-10-31 NOTE — OCCUPATIONAL THERAPY INITIAL EVALUATION ADULT - HOME MANAGEMENT SKILLS, PREVIOUS LEVEL OF FUNCTION, OT EVAL
Pt contacted [facility]  - stated he was seen by Dr. Jansen one week ago, prescribed prednisone for his bilateral feet pain. He states the pain improved and he is scheduling an appointment with rheumatology. He is requesting a refill on prednisone as he is traveling this weekend and is to follow up with rheumatology next week. Pt advised Dr. Jansen is not in office today, but will discuss with Dr. Rowell who is in office today. Pt verbalized understanding.     Case discussed with Dr. Rowell. OK to re-prescribe an additional course of prednisone 50 mg daily x5 days, however, last refill and patient will need to follow up as discussed with rheumatology.     Contacted patient back, advised Dr. Rowell willing to prescribe refill, however, he must follow up with rheumatology as no more refills will be provided. Pt verbalized understanding. Pharmacy confirmed. Script e-prescribed to pharmacy of choice. No further questions at time of call.   
independent

## 2024-10-31 NOTE — SWALLOW BEDSIDE ASSESSMENT ADULT - ASPIRATION PRECAUTIONS
Discharge instructions explained and understood by the patient. IV discontinued, no redness, swelling or pain noted. Pt discharged off the unit via wheelchair with MT.     TRANSFER - OUT REPORT:    Verbal report given to OUMOU Elmore (name) on Benjamin Reyes  being transferred to [facility]) for routine progression of care       Report consisted of patients Situation, Background, Assessment and   Recommendations(SBAR). Information from the following report(s) SBAR, Kardex, Intake/Output and MAR was reviewed with the receiving nurse. Lines:       Opportunity for questions and clarification was provided.       Patient transported with:   [employer] yes

## 2024-10-31 NOTE — CHART NOTE - NSCHARTNOTEFT_GEN_A_CORE
The neuroendovascular team was consulted while the patient was at [facility] for evaluation of late-subacute parenchymal hematoma within the right temporal lobe (~2.3cm) and numerous micro hemorrhages observed on MRI. The patient has been transferred today to [facility] and an in-person evaluation will be completed tomorrow.   Please keep the patient NPO except medication after midnight tonight in the event that a diagnostic cerebral angiogram is warranted tomorrow.   Updates to be provided in the morning 11/1.   [id number] Neuroendovascular

## 2024-10-31 NOTE — OCCUPATIONAL THERAPY INITIAL EVALUATION ADULT - PERTINENT HX OF CURRENT PROBLEM, REHAB EVAL
Pt is a 54-year-old white female with no significant past medical history comes to the hospital with first witnessed seizure as per witness patient was complaining of feeling tired woke up stared at the nila and had a generalized seizure and lost bladder function patient denies alcohol abuse or drug abuse or prior history of seizures, any fevers. in the emergency room blood patient's blood pressure was elevated and was seen and she was given IV labetalol.  No recurring seizure since  Pt is back to her base line.  Imaging in ER shows ischemic changes and white matter abnormalities. MRI recommended to r/o demyelinating disease. vs inflammatory process.   Neurology was consulted recommendations to admit to ICU stepdown with general neurochecks every 4 hours.

## 2024-10-31 NOTE — CHART NOTE - NSCHARTNOTEFT_GEN_A_CORE
Patient transferred from [facility] ICU to [address] medical floor    53 yo RHF w/ no PMH p/w new onset witnessed GTC seizure with multiple areas of hypoattenuation on HCT suspect provoked seizure found with R-sided PLEDs likely secondary to subacute R temporal hematoma.  CTV done to assess for possible venous trhombosis as cause of hematoma, report pending. Brain MRI showed chronic microhemorrhages possiblly 2/2 vasculitis vs CAA vs undiagnosed HTN.  Discussed with neuroendovascular Dr. Murry and vascular neurology Dr. Bourgeois and plan was for transfer to [address] under stroke service and possible diagnostic angiogram later this week.     Neurological exam  MENTAL STATUS: AAOx3. Able to follow two step commands  LANG/SPEECH: Fluent, ++paraphrasic errors ulises with repetition ("No ands ifs or whats about it" instead of "No ands ifs or buts about it". Intact comprehension   CRANIAL NERVES:  II: Normal visual fields. After nurse passes in the hallway, she sees her shadow pass again right after   III, IV, VI: EOM intact, no gaze preference or deviation  V: normal  VII: no facial asymmetry  VIII: normal hearing to speech  MOTOR: 5/5 in both upper and lower extremities  REFLEXES:  downgoing toes  SENSORY: Questionable altered sensation in left arm vs right arm to light touch ("feels like a pencil is touching left, and a hand is touching right"). Normal in LEs. No neglect   COORD: Normal finger to nose and heel to shin, no tremor  Gait: deferred       Plan   #subacute R temporal hematoma   - neuro checks and vitals q8hrs   - Maintain normotension, c/w nifedipine 60mg qHS   - F/u CT venogram report   - Speech Therapy/OT/PT Evaluation    - NeuroIR Consult (Dr. Murry)    - SCDs , No anti-platelets/anti coagulants at this time    - vasculitis workup sent, f/u results     #new onset GTCS likely i/s/o R. temporal hematoma   - continue Keppra 1g q12h   - seizure precautions   - continue vEEG       #HTN    - BP goal: normotension   - c/w nifedipine 60mg qHS       #HLD   - obtain lipid panel Patient transferred from [facility] ICU to [address] medical floor    53 yo RHF w/ no PMH p/w new onset witnessed GTC seizure with multiple areas of hypoattenuation on HCT suspect provoked seizure found with R-sided PLEDs likely secondary to subacute R temporal hematoma so patient was started on keppra 1gm q12.  CTV done to assess for possible venous thrombosis as cause of hematoma, report pending. Brain MRI showed chronic microhemorrhages possiblly 2/2 vasculitis vs CAA vs undiagnosed HTN.  Discussed with neuroendovascular Dr. Murry and vascular neurology Dr. Bourgeois and plan was for transfer to [address] under stroke service and possible diagnostic angiogram later this week.     Neurological exam  MENTAL STATUS: AAOx3. Able to follow two step commands  LANG/SPEECH: Fluent, ++paraphrasic errors ulises with repetition, Intact comprehension   CRANIAL NERVES:  II: Normal visual fields. After nurse passes in the hallway, she sees her shadow pass again right after   III, IV, VI: EOM intact, no gaze preference or deviation  V: normal  VII: no facial asymmetry  VIII: normal hearing to speech  MOTOR: 5/5 in both upper and lower extremities  REFLEXES:  downgoing toes  SENSORY: decreased sensation to pin prick in left arm vs right arm Normal in LEs. No neglect   COORD: Normal finger to nose and heel to shin, no tremor  Gait: deferred       Plan   #subacute R temporal hematoma   - neuro checks and vitals q8hrs   - Maintain normotension, c/w nifedipine 60mg qHS   - F/u CT venogram report   - Speech Therapy/OT/PT Evaluation    - NeuroIR Consult (Dr. Murry)    - SCDs , No anti-platelets/anti coagulants at this time    - vasculitis workup sent, f/u results     #new onset GTCS likely i/s/o R. temporal hematoma   - continue Keppra 1g q12h   - seizure precautions   - continue vEEG       #HTN    - BP goal: normotension   - c/w nifedipine 60mg qHS       #HLD   - obtain lipid panel

## 2024-10-31 NOTE — PROGRESS NOTE ADULT - ASSESSMENT
54-year-old female with no significant past medical history comes to the hospital with first witnessed seizure.  she was admitted to [facility] and now transfserred to [place] for further w/u.    #GTC Seizure--new onset  #subacute R temporal lobe hematoma 2.3 cm with diffuse microhemorrhages  -continue managment as per the neurology team---medicine following for comanagement  -f/u neurology IR for DSA  -continue neurochecks q6hr  -continue keppra 1000 mg q12 hr---monitor level  -currently on VEEG---f/u report  -f/u CT venogram  -nifedipine xl 60 mg daily   -monitor electrolytes---mg >2  -fall precautions  -monitor vs closely---remains afebrile and hemodynamically stable     DVT/GI ppx    FULL CODE    Total time spent to complete patient's bedside assessment, review medical chart, discuss medical plan of care with covering medical team was more than 35 minutes  with >50% of time spendt face to face with patient, discussion with patient/family and/or coordination of care    please contact me via teams or [employer] #[phone] for any additional questions    thank you for this consult and the hospitalist group will continue to follow the patient during hospitalization

## 2024-10-31 NOTE — OCCUPATIONAL THERAPY INITIAL EVALUATION ADULT - TRANSFER TRAINING, PT EVAL
Pt will perform car transfer with independent by dc. Pt will perform tub transfer with independent by dc.

## 2024-11-01 ENCOUNTER — TRANSCRIPTION ENCOUNTER (OUTPATIENT)
Age: 54
End: 2024-11-01

## 2024-11-01 LAB
A1C WITH ESTIMATED AVERAGE GLUCOSE RESULT: 5.7 % — HIGH (ref 4–5.6)
ANION GAP SERPL CALC-SCNC: 16 MMOL/L — HIGH (ref 7–14)
BASOPHILS # BLD AUTO: 0.06 K/UL — SIGNIFICANT CHANGE UP (ref 0–0.2)
BASOPHILS NFR BLD AUTO: 0.7 % — SIGNIFICANT CHANGE UP (ref 0–1)
BUN SERPL-MCNC: 12 MG/DL — SIGNIFICANT CHANGE UP (ref 10–20)
CALCIUM SERPL-MCNC: 9.3 MG/DL — SIGNIFICANT CHANGE UP (ref 8.4–10.4)
CHLORIDE SERPL-SCNC: 104 MMOL/L — SIGNIFICANT CHANGE UP (ref 98–110)
CHOLEST SERPL-MCNC: 249 MG/DL — HIGH
CO2 SERPL-SCNC: 23 MMOL/L — SIGNIFICANT CHANGE UP (ref 17–32)
CREAT SERPL-MCNC: 0.8 MG/DL — SIGNIFICANT CHANGE UP (ref 0.7–1.5)
EGFR: 88 ML/MIN/1.73M2 — SIGNIFICANT CHANGE UP
EOSINOPHIL # BLD AUTO: 0.43 K/UL — SIGNIFICANT CHANGE UP (ref 0–0.7)
EOSINOPHIL NFR BLD AUTO: 5 % — SIGNIFICANT CHANGE UP (ref 0–8)
ESTIMATED AVERAGE GLUCOSE: 117 MG/DL — HIGH (ref 68–114)
GLUCOSE SERPL-MCNC: 106 MG/DL — HIGH (ref 70–99)
HCT VFR BLD CALC: 44.2 % — SIGNIFICANT CHANGE UP (ref 37–47)
HDLC SERPL-MCNC: 65 MG/DL — SIGNIFICANT CHANGE UP
HGB BLD-MCNC: 14.5 G/DL — SIGNIFICANT CHANGE UP (ref 12–16)
IMM GRANULOCYTES NFR BLD AUTO: 0.2 % — SIGNIFICANT CHANGE UP (ref 0.1–0.3)
LIPID PNL WITH DIRECT LDL SERPL: 169 MG/DL — HIGH
LYMPHOCYTES # BLD AUTO: 2.03 K/UL — SIGNIFICANT CHANGE UP (ref 1.2–3.4)
LYMPHOCYTES # BLD AUTO: 23.4 % — SIGNIFICANT CHANGE UP (ref 20.5–51.1)
MAGNESIUM SERPL-MCNC: 2.1 MG/DL — SIGNIFICANT CHANGE UP (ref 1.8–2.4)
MCHC RBC-ENTMCNC: 28.5 PG — SIGNIFICANT CHANGE UP (ref 27–31)
MCHC RBC-ENTMCNC: 32.8 G/DL — SIGNIFICANT CHANGE UP (ref 32–37)
MCV RBC AUTO: 87 FL — SIGNIFICANT CHANGE UP (ref 81–99)
MONOCYTES # BLD AUTO: 0.92 K/UL — HIGH (ref 0.1–0.6)
MONOCYTES NFR BLD AUTO: 10.6 % — HIGH (ref 1.7–9.3)
NEUTROPHILS # BLD AUTO: 5.22 K/UL — SIGNIFICANT CHANGE UP (ref 1.4–6.5)
NEUTROPHILS NFR BLD AUTO: 60.1 % — SIGNIFICANT CHANGE UP (ref 42.2–75.2)
NON HDL CHOLESTEROL: 184 MG/DL — HIGH
NRBC # BLD: 0 /100 WBCS — SIGNIFICANT CHANGE UP (ref 0–0)
PLATELET # BLD AUTO: 326 K/UL — SIGNIFICANT CHANGE UP (ref 130–400)
PMV BLD: 10.3 FL — SIGNIFICANT CHANGE UP (ref 7.4–10.4)
POTASSIUM SERPL-MCNC: 3.8 MMOL/L — SIGNIFICANT CHANGE UP (ref 3.5–5)
POTASSIUM SERPL-SCNC: 3.8 MMOL/L — SIGNIFICANT CHANGE UP (ref 3.5–5)
RBC # BLD: 5.08 M/UL — SIGNIFICANT CHANGE UP (ref 4.2–5.4)
RBC # FLD: 13.1 % — SIGNIFICANT CHANGE UP (ref 11.5–14.5)
SODIUM SERPL-SCNC: 143 MMOL/L — SIGNIFICANT CHANGE UP (ref 135–146)
TRIGL SERPL-MCNC: 75 MG/DL — SIGNIFICANT CHANGE UP
TSH SERPL-MCNC: 3.77 UIU/ML — SIGNIFICANT CHANGE UP (ref 0.27–4.2)
WBC # BLD: 8.68 K/UL — SIGNIFICANT CHANGE UP (ref 4.8–10.8)
WBC # FLD AUTO: 8.68 K/UL — SIGNIFICANT CHANGE UP (ref 4.8–10.8)

## 2024-11-01 PROCEDURE — 95720 EEG PHY/QHP EA INCR W/VEEG: CPT

## 2024-11-01 PROCEDURE — 99233 SBSQ HOSP IP/OBS HIGH 50: CPT

## 2024-11-01 PROCEDURE — 99232 SBSQ HOSP IP/OBS MODERATE 35: CPT

## 2024-11-01 RX ORDER — LEVETIRACETAM 500 MG/1
1250 TABLET, FILM COATED ORAL DAILY
Refills: 0 | Status: DISCONTINUED | OUTPATIENT
Start: 2024-11-01 | End: 2024-11-02

## 2024-11-01 RX ORDER — LEVETIRACETAM 500 MG/1
1000 TABLET, FILM COATED ORAL DAILY
Refills: 0 | Status: DISCONTINUED | OUTPATIENT
Start: 2024-11-02 | End: 2024-11-04

## 2024-11-01 RX ORDER — NIFEDIPINE 90 MG
60 TABLET, EXTENDED RELEASE 24 HR ORAL
Refills: 0 | Status: DISCONTINUED | OUTPATIENT
Start: 2024-11-01 | End: 2024-11-04

## 2024-11-01 RX ADMIN — LEVETIRACETAM 1250 MILLIGRAM(S): 500 TABLET, FILM COATED ORAL at 17:12

## 2024-11-01 RX ADMIN — Medication 60 MILLIGRAM(S): at 15:08

## 2024-11-01 RX ADMIN — LEVETIRACETAM 1000 MILLIGRAM(S): 500 TABLET, FILM COATED ORAL at 05:08

## 2024-11-01 NOTE — DISCHARGE NOTE PROVIDER - CARE PROVIDER_API CALL
Kurt Murry  Neurosurgery  [address] [place] [address], [address]  [place] [address]  Phone: [phone]  Fax: [phone]  Follow Up Time: 1 month    Christine Pineda  Neurology  [address] [place] [id number]  Phone: [phone]  Fax: [phone]  Follow Up Time: 2 weeks

## 2024-11-01 NOTE — EEG REPORT - NS EEG TEXT BOX
Epilepsy Attending Note:     YOLANDA SANTILLAN    54y Female  MRN [id number]    Vital Signs Last 24 Hrs  T(C): 36.7 (2024 08:21), Max: 36.9 (31 Oct 2024 13:37)  T(F): 98 (2024 08:21), Max: 98.4 (31 Oct 2024 13:37)  HR: 97 (2024 08:21) (95 - 97)  BP: 144/89 (2024 08:21) (144/89 - 168/95)  BP(mean): 107 (2024 05:11) (107 - 119)  RR: 16 (2024 08:21) (16 - 18)  SpO2: 95% (2024 05:11) (94% - 98%)    Parameters below as of 2024 05:11  Patient On (Oxygen Delivery Method): room air                              14.5   8.68  )-----------( 326      ( 2024 06:46 )             44.2           143  |  104  |  12  ----------------------------<  106[H]  3.8   |  23  |  0.8    Ca    9.3      2024 06:46  Mg     2.1             MEDICATIONS  (STANDING):  levETIRAcetam 1000 milliGRAM(s) Oral two times a day  NIFEdipine XL 60 milliGRAM(s) Oral <User Schedule>  sodium chloride 0.9%. 1000 milliLiter(s) (75 mL/Hr) IV Continuous <Continuous>    MEDICATIONS  (PRN):  lidocaine 2% Viscous 5 milliLiter(s) Swish and Spit three times a day PRN Mouth Care  LORazepam   Injectable 2 milliGRAM(s) IV Push once PRN Seizure Activity            VEEG in the last 24 hours:    Background - continuous, symmetrical, less than optimally organized, reaching frequencies in the range of 8-9 Hz superimposed by small amount of lower range theta, showing good reactivity    Focal and generalized slowin. borderline to mild generalized slowing  2.. right posterior quadrant, mainly posterior temporal focal slowing    Interictal activity - large to frequent number of right  hemispheric, mainly T-O low amplitude sharp waves and spikes that at times appear in quasiperiodic pattern.    Events - see below    Seizures - at 2 am there was an electrographic event lasting for 25 sec form right T-O region characterized by rhythmic 2-3 Hz s[pike and after going slow that evolved in faster rhythmic activity.    Impression: Abnormal VEEG as above    Plan - per neurovascular team    
Epilepsy Attending Note:     ANUP SANTILLANJORIE    54y Female  MRN [id number]    Vital Signs Last 24 Hrs  T(C): 36.6 (30 Oct 2024 11:05), Max: 37.1 (29 Oct 2024 15:05)  T(F): 97.9 (30 Oct 2024 11:05), Max: 98.8 (29 Oct 2024 23:25)  HR: 98 (30 Oct 2024 11:05) (83 - 100)  BP: 133/80 (30 Oct 2024 11:05) (125/56 - 193/107)  BP(mean): 101 (30 Oct 2024 11:05) (81 - 143)  RR: 23 (30 Oct 2024 11:05) (16 - 25)  SpO2: 96% (30 Oct 2024 11:05) (93% - 98%)    Parameters below as of 30 Oct 2024 04:33  Patient On (Oxygen Delivery Method): room air                              13.9   10.18 )-----------( 308      ( 30 Oct 2024 11:18 )             42.3       10-29    135  |  95[L]  |  14  ----------------------------<  141[H]  3.9   |  24  |  0.7    Ca    9.6      29 Oct 2024 00:24  Mg     2.2     10-29    TPro  7.9  /  Alb  4.5  /  TBili  0.4  /  DBili  x   /  AST  16  /  ALT  21  /  AlkPhos  125[H]  10-29      MEDICATIONS  (STANDING):  levETIRAcetam 1000 milliGRAM(s) Oral two times a day  NIFEdipine XL 60 milliGRAM(s) Oral at bedtime    MEDICATIONS  (PRN):  lidocaine 2% Viscous 5 milliLiter(s) Swish and Spit three times a day PRN Mouth Care  LORazepam   Injectable 2 milliGRAM(s) IV Push once PRN Seizure Activity            VEEG in the last 24 hours:    Background - continuous, slightly asymmetrical with better expression of the PDR from the left side, reaching frequencies in the range of 8-9 Hz superimposed by small amount of lower range low amplitude theta    Focal and generalized slowin. borderline generalized slowing  2. mild to moderate right hemispheric, mainly temporal and central focal slowing    Interictal activity - frequent right FT sharp waves and spikes that for the most part presented in periodic pattern    Events - none    Seizures - none    Impression: Abnormal VEEG as above    Plan - per neurology team

## 2024-11-01 NOTE — PROGRESS NOTE ADULT - SUBJECTIVE AND OBJECTIVE BOX
--------Stroke Progress Note--------    INTERVAL HPI / OVERNIGHT EVENTS:  Overnight, no acute events.  Today, patient was seen and examined.     Vital Signs Last 24 Hrs  T(C): 36.7 (01 Nov 2024 05:11), Max: 36.9 (31 Oct 2024 13:37)  T(F): 98 (01 Nov 2024 05:11), Max: 98.4 (31 Oct 2024 13:37)  HR: 97 (01 Nov 2024 05:11) (95 - 97)  BP: 144/89 (01 Nov 2024 05:11) (144/89 - 168/95)  BP(mean): 107 (01 Nov 2024 05:11) (107 - 119)  RR: 18 (01 Nov 2024 05:11) (18 - 18)  SpO2: 95% (01 Nov 2024 05:11) (94% - 98%)    Parameters below as of 01 Nov 2024 05:11  Patient On (Oxygen Delivery Method): room air      Physical exam:  General: No acute distress, awake and alert  Eyes: Anicteric sclerae, moist conjunctivae, see below for CNs  Neck: trachea midline, FROM, supple, no thyromegaly or lymphadenopathy  Cardiovascular: Regular rate and rhythm, no murmurs, rubs, or gallops. No carotid bruits.   Pulmonary: Anterior breath sounds clear bilaterally, no crackles or wheezing. No use of accessory muscles  GI: Abdomen soft, non-distended, non-tender  Extremities: Radial and DP pulses +2, no edema    Neurological:  MENTAL STATUS: AAOx3. Able to follow two step commands  LANG/SPEECH: Fluent, repetition intact, Intact comprehension, no dysarthria  CRANIAL NERVES:  II: Normal visual fields. No visual disturbance  III, IV, VI: EOM intact, no gaze preference or deviation  V: normal  VII: no facial asymmetry  VIII: normal hearing to speech  MOTOR: 5/5 in both upper and lower extremities  REFLEXES:  downgoing toes  SENSORY: decreased sensation to pin prick in left arm vs right arm Normal in LEs. No neglect   COORD: Normal finger to nose and heel to shin, no tremor  Gait: deferred due to patient safety    NIHSS: 0    LABS:                        13.9   10.18 )-----------( 308      ( 30 Oct 2024 11:18 )             42.3     10-30    141  |  104  |  10  ----------------------------<  91  3.5   |  23  |  0.7    Ca    9.4      30 Oct 2024 11:18  Phos  3.3     10-30  Mg     2.1     10-30    TPro  7.2  /  Alb  3.9  /  TBili  0.8  /  DBili  x   /  AST  16  /  ALT  18  /  AlkPhos  105  10-30      Urinalysis Basic - ( 30 Oct 2024 11:18 )    Color: x / Appearance: x / SG: x / pH: x  Gluc: 91 mg/dL / Ketone: x  / Bili: x / Urobili: x   Blood: x / Protein: x / Nitrite: x   Leuk Esterase: x / RBC: x / WBC x   Sq Epi: x / Non Sq Epi: x / Bacteria: x        MEDICATIONS  (STANDING):  levETIRAcetam 1000 milliGRAM(s) Oral two times a day  NIFEdipine XL 60 milliGRAM(s) Oral at bedtime  sodium chloride 0.9%. 1000 milliLiter(s) (75 mL/Hr) IV Continuous <Continuous>    MEDICATIONS  (PRN):  lidocaine 2% Viscous 5 milliLiter(s) Swish and Spit three times a day PRN Mouth Care  LORazepam   Injectable 2 milliGRAM(s) IV Push once PRN Seizure Activity    Allergies    No Known Allergies    Intolerances        RADIOLOGY & ADDITIONAL TESTS:    CTH 10/28: Decreased attenuation of periventricular and subcortical white matter, nonspecific. Considerations include inflammatory and demyelinating diseases such as multiple sclerosis.   Confluent regional decreased attenuation at right temporal lobe with loss of gray-white junction, which could represent area of early ischemic change. Further evaluation recommended with MR brain with IV contrast.  Chronic ischemic change to left occipital lobe.    CTA 10/29: No large vessel occlusion, aneurysm, or vascular malformation. Scattered areas of diminished density, white matter lesions, better depicted CT scan October 28.      MR head 10/29:  1.  Late-subacute parenchymal hematoma within the right temporal lobe measuring about 2.3 cm with mild surrounding edema. No nodular or   masslike enhancement but recommend follow-up imaging to demonstrate complete resolution.  2.  Mild-moderate white matter disease with corpus callosum involvement, nonspecific. Diagnostic considerations include chronic microvascular changes, demyelination and gliosis.  3.  Numerous microhemorrhages throughout the brain which can reflect   sequela of hypertensive microhemorrhage, amyloid angiopathy, or prior inflammation/infection.    CTV: No evidence for venous sinus thrombosis     --------Stroke Progress Note--------    INTERVAL HPI / OVERNIGHT EVENTS:  Overnight, no acute events.  Today, patient was seen and examined. Patient states she feels stable, requesting to have IV replaced and DSA as early as possible. Patient is updated about possible DSA on Sunday.    Vital Signs Last 24 Hrs  T(C): 36.7 (01 Nov 2024 05:11), Max: 36.9 (31 Oct 2024 13:37)  T(F): 98 (01 Nov 2024 05:11), Max: 98.4 (31 Oct 2024 13:37)  HR: 97 (01 Nov 2024 05:11) (95 - 97)  BP: 144/89 (01 Nov 2024 05:11) (144/89 - 168/95)  BP(mean): 107 (01 Nov 2024 05:11) (107 - 119)  RR: 18 (01 Nov 2024 05:11) (18 - 18)  SpO2: 95% (01 Nov 2024 05:11) (94% - 98%)    Parameters below as of 01 Nov 2024 05:11  Patient On (Oxygen Delivery Method): room air      Physical exam:  General: No acute distress, awake and alert  Eyes: Anicteric sclerae, moist conjunctivae, see below for CNs  Neck: trachea midline, FROM, supple, no thyromegaly or lymphadenopathy  Cardiovascular: Regular rate and rhythm, no murmurs, rubs, or gallops. No carotid bruits.   Pulmonary: Anterior breath sounds clear bilaterally, no crackles or wheezing. No use of accessory muscles  GI: Abdomen soft, non-distended, non-tender  Extremities: Radial and DP pulses +2, no edema    Neurological:  MENTAL STATUS: AAOx3. Able to follow two step commands  LANG/SPEECH: Fluent, repetition intact, Intact comprehension, no dysarthria  CRANIAL NERVES:  II: Normal visual fields. No visual disturbance  III, IV, VI: EOM intact, no gaze preference or deviation  V: normal  VII: no facial asymmetry  VIII: normal hearing to speech  MOTOR: 5/5 in both upper and lower extremities  REFLEXES:  downgoing toes  SENSORY: decreased sensation to pin prick in left arm vs right arm Normal in LEs. No neglect   COORD: Normal finger to nose and heel to shin, no tremor  Gait: deferred due to patient safety    NIHSS: 0    LABS:                        13.9   10.18 )-----------( 308      ( 30 Oct 2024 11:18 )             42.3     10-30    141  |  104  |  10  ----------------------------<  91  3.5   |  23  |  0.7    Ca    9.4      30 Oct 2024 11:18  Phos  3.3     10-30  Mg     2.1     10-30    TPro  7.2  /  Alb  3.9  /  TBili  0.8  /  DBili  x   /  AST  16  /  ALT  18  /  AlkPhos  105  10-30      Urinalysis Basic - ( 30 Oct 2024 11:18 )    Color: x / Appearance: x / SG: x / pH: x  Gluc: 91 mg/dL / Ketone: x  / Bili: x / Urobili: x   Blood: x / Protein: x / Nitrite: x   Leuk Esterase: x / RBC: x / WBC x   Sq Epi: x / Non Sq Epi: x / Bacteria: x        MEDICATIONS  (STANDING):  levETIRAcetam 1000 milliGRAM(s) Oral two times a day  NIFEdipine XL 60 milliGRAM(s) Oral at bedtime  sodium chloride 0.9%. 1000 milliLiter(s) (75 mL/Hr) IV Continuous <Continuous>    MEDICATIONS  (PRN):  lidocaine 2% Viscous 5 milliLiter(s) Swish and Spit three times a day PRN Mouth Care  LORazepam   Injectable 2 milliGRAM(s) IV Push once PRN Seizure Activity    Allergies    No Known Allergies    Intolerances        RADIOLOGY & ADDITIONAL TESTS:    CTH 10/28: Decreased attenuation of periventricular and subcortical white matter, nonspecific. Considerations include inflammatory and demyelinating diseases such as multiple sclerosis.   Confluent regional decreased attenuation at right temporal lobe with loss of gray-white junction, which could represent area of early ischemic change. Further evaluation recommended with MR brain with IV contrast.  Chronic ischemic change to left occipital lobe.    CTA 10/29: No large vessel occlusion, aneurysm, or vascular malformation. Scattered areas of diminished density, white matter lesions, better depicted CT scan October 28.      MR head 10/29:  1.  Late-subacute parenchymal hematoma within the right temporal lobe measuring about 2.3 cm with mild surrounding edema. No nodular or   masslike enhancement but recommend follow-up imaging to demonstrate complete resolution.  2.  Mild-moderate white matter disease with corpus callosum involvement, nonspecific. Diagnostic considerations include chronic microvascular changes, demyelination and gliosis.  3.  Numerous microhemorrhages throughout the brain which can reflect   sequela of hypertensive microhemorrhage, amyloid angiopathy, or prior inflammation/infection.    CTV: No evidence for venous sinus thrombosis.

## 2024-11-01 NOTE — CONSULT NOTE ADULT - SUBJECTIVE AND OBJECTIVE BOX
Neuroendovascular Consult:   Consulted for:  Consideration for a diagnostic cerebral angiogram    HPI:  54-year-old white female with no significant past medical history comes to the hospital with first witnessed seizure as per witness patient was complaining of feeling tired woke up stared at the nila and had a generalized seizure and lost bladder function patient denies alcohol abuse or drug abuse or prior history of seizures, any fevers. in the emergency room blood patient's blood pressure was elevated and was seen and she was given IV labetalol.  No recurring seizure since  Pt is back to her base line.  Imaging in ER shows ischemic changes and white matter abnormalities. MRI recommended to r/o demyelinating disease. vs inflammatory process.   Neurology was consulted recommendations to admit to ICU stepdown with general neurochecks every 4 hours (29 Oct 2024 04:29)    Interval HPI: The neuroendovascular team was consulted while the patient was at [facility] for evaluation of late-subacute parenchymal hematoma within the right temporal lobe (~2.3cm) and numerous micro hemorrhages observed on MRI. The patient was transferred to [facility] ([place] for further evaluation. On exam this morning, she has no acute complaints.     PAST MEDICAL & SURGICAL HISTORY:  No pertinent past medical history    Pertinent PMHx     MEDICATIONS  (STANDING):  levETIRAcetam 1250 milliGRAM(s) Oral daily  NIFEdipine XL 60 milliGRAM(s) Oral <User Schedule>  sodium chloride 0.9%. 1000 milliLiter(s) (75 mL/Hr) IV Continuous <Continuous>    MEDICATIONS  (PRN):  lidocaine 2% Viscous 5 milliLiter(s) Swish and Spit three times a day PRN Mouth Care  LORazepam   Injectable 2 milliGRAM(s) IV Push once PRN Seizure Activity    Allergies    No Known Allergies  Intolerances    FAMILY HISTORY:    Physical Exam:   Vital Signs Last 24 Hrs  T(C): 36.7 (01 Nov 2024 13:40), Max: 36.7 (31 Oct 2024 20:28)  T(F): 98.1 (01 Nov 2024 13:40), Max: 98.1 (01 Nov 2024 13:40)  HR: 91 (01 Nov 2024 13:40) (91 - 97)  BP: 160/70 (01 Nov 2024 13:40) (144/89 - 168/95)  BP(mean): 100 (01 Nov 2024 13:40) (100 - 119)  RR: 18 (01 Nov 2024 13:40) (16 - 18)  SpO2: 97% (01 Nov 2024 13:40) (94% - 97%)    Parameters below as of 01 Nov 2024 13:40  Patient On (Oxygen Delivery Method): room air    General: NAD, seated in bed watching tv  Neuro: AAOx3, following 2-step commands, moving all extremities antigravity without drift, no dysmetria on FTN or HTS, V2-3 intact to light touch and symmetrical, subjective reduced RUE sensation, EOMI, visual fields full    Labs:                         14.5   8.68  )-----------( 326      ( 01 Nov 2024 06:46 )             44.2     11-01    143  |  104  |  12  ----------------------------<  106[H]  3.8   |  23  |  0.8    Ca    9.3      01 Nov 2024 06:46  Mg     2.1     11-01          Pertinent labs:                      14.5   8.68  )-----------( 326      ( 01 Nov 2024 06:46 )             44.2       11-01    143  |  104  |  12  ----------------------------<  106[H]  3.8   |  23  |  0.8    Ca    9.3      01 Nov 2024 06:46  Mg     2.1     11-01    Radiology & Additional Studies:   Radiology imaging reviewed.     Assessment:   54-year-old male with no significant PMHx who presented s/p generalized seizure and was found to have a  late-subacute parenchymal hematoma within the right temporal lobe (~2.3cm) and numerous micro hemorrhages on MRI. The patient was transferred to [facility] ([place] for further evaluation. On exam this morning she has no acute complaints. The patient denies current headache, nausea/vomiting, and dizziness. Radiological imaging was reviewed by Dr. Murry and the neuroendovascular ACP. The possibility of a diagnostic cerebral angiogram as well as risks vs benefits was reviewed with the patient.    Suggestions:   - Plan for a diagnostic cerebral angiogram this coming Sunday (depending on IR availability)  - Keep NPO except medication after midnight Saturday 11/2 with normal saline IV as appropriate  - Coags, CBC, CMP AM labs Sunday   - Continued management by the neurovascular team  [id number] Neuroendovascular

## 2024-11-01 NOTE — DISCHARGE NOTE PROVIDER - NSDCCPCAREPLAN_GEN_ALL_CORE_FT
PRINCIPAL DISCHARGE DIAGNOSIS  Diagnosis: ICH (intracerebral hemorrhage)  Assessment and Plan of Treatment: Hemorrhage is the medical term for bleeding. Hemorrhagic stroke occurs when blood vessels in the brain leak or rupture, causing bleeding in or around the brain. About 20 percent of strokes are hemorrhagic strokes. Damage can occur quickly due to the pressure of increasing amounts of blood or because of the blood itself. Blood is irritating to the brain tissue, causing it to swell. Bleeding into the brain is called intracerebral hemorrhage (ICH) and is often caused by high blood pressure.  The treatment of a hemorrhagic stroke depends upon the cause of the bleeding (eg, high blood pressure, use of anticoagulant medications, head trauma, blood vessel malformation). Most patients are monitored closely in an intensive care unit during and after a hemorrhagic stroke. The initial care of a person with hemorrhagic stroke includes several components:  - Determining the cause of the bleeding.  - Controlling the blood pressure.  - Stopping any medication that could increase bleeding (eg, warfarin or another blood thinner, aspirin). If the patient has been taking a blood thinner, specific treatments or transfusions of blood clotting factors may be given to stop ongoing bleeding.  - Measuring and controlling the pressure within the brain and within the skull.  Please follow up with neuroendovascular doctor regarding repeating MR head and further treatment.   Please follow up with stroke clinic in 2 weeks.      SECONDARY DISCHARGE DIAGNOSES  Diagnosis: Seizure-like activity  Assessment and Plan of Treatment: A seizure is abnormal electrical activity in the brain; in your case, the intracranial hemorrhage likely is the cause of your new-onsetl seizures. Prior to a seizure you may experience a warning sensation (aura) that may include fear, nausea, dizziness, and visual changes such as flashing lights of spots. Common symptoms during the seizure may include an altered mental status, rhythmic jerking movements, drooling, grunting, loss of bladder or bowel control, or tongue biting. After a seizure, you may feel confused and sleepy.   Do not swim, drive, includes laying you on the ground with your head on a cushion and turning you to the side to keep your breathing passages clear in case of vomiting.  operate machinery, or engage in any risky activity during which a seizure could cause further injury to you or others. Teach friends and family what to do if you HAVE a seizure which   SEEK IMMEDIATE MEDICAL CARE IF YOU HAVE ANY OF THE FOLLOWING SYMPTOMS: seizure lasting over 5 minutes, not waking up or persistent altered mental status after the seizure, or more frequent or worsening seizures.

## 2024-11-01 NOTE — DISCHARGE NOTE PROVIDER - NSDCMRMEDTOKEN_GEN_ALL_CORE_FT
Keppra 1000 mg oral tablet: 1 tab(s) orally 2 times a day One pill in the morning; One pill at bedtime with the other 250mg pill.  Keppra 250 mg oral tablet: 1 tab(s) orally once a day (at bedtime)  NIFEdipine 60 mg oral tablet, extended release: 1 tab(s) orally once a day

## 2024-11-01 NOTE — DISCHARGE NOTE PROVIDER - CARE PROVIDERS DIRECT ADDRESSES
[email or web address]

## 2024-11-01 NOTE — PROGRESS NOTE ADULT - SUBJECTIVE AND OBJECTIVE BOX
Patient is a 54y old  Female who presents with a chief complaint of GTCS, R temporal hematoma (31 Oct 2024 07:33)    HPI:  54-year-old white female with no significant past medical history comes to the hospital with first witnessed seizure as per witness patient was complaining of feeling tired woke up stared at the nila and had a generalized seizure and lost bladder function patient denies alcohol abuse or drug abuse or prior history of seizures, any fevers. in the emergency room blood patient's blood pressure was elevated and was seen and she was given IV labetalol.  No recurring seizure since  Pt is back to her base line.  Imaging in ER shows ischemic changes and white matter abnormalities. MRI recommended to r/o demyelinating disease. vs inflammatory process.   Neurology was consulted recommendations to admit to ICU stepdown with general neurochecks every 4 hours (29 Oct 2024 04:29)    PAST MEDICAL & SURGICAL HISTORY:  No pertinent past medical history      patient seen and examined independently on morning rounds, chart reviewed and discussed with the neurology team:   hospital day #3  transferred from [facility] to [facility]---had VEEG on overnight and removed this morning   awaiting neurovasc for DSA  reports episode of red flashing lights in vision yesterday which quickly resolved (but she did not notify team or  who was bedside this morning)                 PE:  GEN-NAD, AAOx3, resting comfortably  HEENT- NCAT, PERRLA, EOMI  PULM- Clear to auscultation bilaterally, fair air entry  CVS- +s1/s2 RRR no murmurs  GI- soft NT ND +bs, no rebound, no guarding  EXT- no edema        Labs:                        14.5   8.68  )-----------( 326      ( 01 Nov 2024 06:46 )             44.2     CBC Full  -  ( 01 Nov 2024 06:46 )  WBC Count : 8.68 K/uL  RBC Count : 5.08 M/uL  Hemoglobin : 14.5 g/dL  Hematocrit : 44.2 %  Platelet Count - Automated : 326 K/uL  Mean Cell Volume : 87.0 fL  Mean Cell Hemoglobin : 28.5 pg  Mean Cell Hemoglobin Concentration : 32.8 g/dL  Auto Neutrophil # : 5.22 K/uL  Auto Lymphocyte # : 2.03 K/uL  Auto Monocyte # : 0.92 K/uL  Auto Eosinophil # : 0.43 K/uL  Auto Basophil # : 0.06 K/uL  Auto Neutrophil % : 60.1 %  Auto Lymphocyte % : 23.4 %  Auto Monocyte % : 10.6 %  Auto Eosinophil % : 5.0 %  Auto Basophil % : 0.7 %      11-01    143  |  104  |  12  ----------------------------<  106[H]  3.8   |  23  |  0.8    Ca    9.3      01 Nov 2024 06:46  Mg     2.1     11-01        Microbiology:      Vital Signs Last 24 Hrs  T(C): 36.7 (01 Nov 2024 13:40), Max: 36.7 (31 Oct 2024 20:28)  T(F): 98.1 (01 Nov 2024 13:40), Max: 98.1 (01 Nov 2024 13:40)  HR: 91 (01 Nov 2024 13:40) (91 - 97)  BP: 160/70 (01 Nov 2024 13:40) (144/89 - 168/95)  BP(mean): 100 (01 Nov 2024 13:40) (100 - 119)  RR: 18 (01 Nov 2024 13:40) (16 - 18)  SpO2: 97% (01 Nov 2024 13:40) (94% - 97%)    Parameters below as of 01 Nov 2024 13:40  Patient On (Oxygen Delivery Method): room air        I&O's Summary    MEDICATIONS  (STANDING):  levETIRAcetam 1250 milliGRAM(s) Oral daily  NIFEdipine XL 60 milliGRAM(s) Oral <User Schedule>  sodium chloride 0.9%. 1000 milliLiter(s) (75 mL/Hr) IV Continuous <Continuous>    MEDICATIONS  (PRN):  lidocaine 2% Viscous 5 milliLiter(s) Swish and Spit three times a day PRN Mouth Care  LORazepam   Injectable 2 milliGRAM(s) IV Push once PRN Seizure Activity

## 2024-11-01 NOTE — DISCHARGE NOTE PROVIDER - PROVIDER TOKENS
PROVIDER:[TOKEN:[[id number]],FOLLOWUP:[1 month]],PROVIDER:[TOKEN:[[id number]],FOLLOWUP:[2 weeks]]

## 2024-11-01 NOTE — DISCHARGE NOTE PROVIDER - HOSPITAL COURSE
53 yo right-handed female w/ HTN in pregnancy, HLD, p/w new onset witnessed GTCS with multiple areas of hypoattenuation on CT suspect provoked seizure. vEEG with R PLEDs and MRI w/ subacute R temporal hematoma with recent CTA w/o LVO. Has evidence of chronic microhemorrhages suspicious for vasculitis vs undiagnosed HTN. CTV neg for sinus thrombosis. Transferred to [place] for DSA with BRYAN. EEG captured a 25 sec seizure overnight 10/31. On exam, patient at baseline.     Hospital course (by problem):   #subacute R temporal hematoma  - Change neuro checks to q8hrs  - Maintain normotension, c/w nifedipine 60mg qHS  - f/u CT venogram report  - Speech Therapy/OT/PT Evaluation   - f/u neuroIR (Dr. Murry)   - No anti-platelets/anti coagulants at this time   - f/u LABS: ANCA, AHSAN, ESR/CRP, Beta 2 glycoprotein, MMA, homocysteine, thyroid autoantibodies, SSA/SSB, ACE level, Complement levels (CH50, C3, C4)     #new onset GTCS likely i/s/o R. temporal hematoma  EEG showed 25-second seizure overnight 10/31.  - c/w Keppra to 1g qAM, 1250mg qHS  - seizure precautions    #HLD  - obtain lipid panel    #HTN  In the ED, BP was elevated and was seen and she was given IV labetalol   - BP goal: normotension  - c/w nifedipine 60mg qHS    New medications:   Medications that were changed:   Medications that were stopped:    Items to follow up as outpatient:     Physical exam at discharge:   53 yo right-handed female w/ HTN in pregnancy, HLD, p/w new onset witnessed GTCS with multiple areas of hypoattenuation on CT suspect provoked seizure. vEEG with R PLEDs and MRI w/ subacute R temporal hematoma with recent CTA w/o LVO. Has evidence of chronic microhemorrhages suspicious for vasculitis vs undiagnosed HTN. CTV neg for sinus thrombosis. Transferred to [place] for DSA with BRYAN, which was completed on 11/03/24. EEG captured a 25 sec seizure overnight 10/31 and Keppra was increased. On exam, patient at baseline.     Hospital course (by problem):   #subacute R temporal hematoma  - Change neuro checks to q8hrs  - Maintain normotension, c/w nifedipine 60mg qHS  - Speech Therapy/OT/PT Evaluation   - No anti-platelets/anti coagulants at this time   - f/u LABS: ANCA, AHSAN, ESR/CRP, Beta 2 glycoprotein, MMA, homocysteine, thyroid autoantibodies, SSA/SSB, ACE level, Complement levels (CH50, C3, C4)     #new onset GTCS likely i/s/o R. temporal hematoma  EEG showed 25-second seizure overnight 10/31.  - c/w Keppra to 1g qAM, 1250mg qHS  - seizure precautions    #HLD  -   - ASCVD 10 year risk of 1.1%. No statin recommended    #HTN  In the ED, BP was elevated and was seen and she was given IV labetalol   - BP goal: normotension  - c/w nifedipine 60mg qHS    New medications: Keppra, nifedipine  Medications that were changed: none  Medications that were stopped: none    Items to follow up as outpatient:   - f/u neuroendovascular in 2-4 weeks  - repeat MRI in 6-8 weeks  - f/u with stroke clinic in 2 weeks    Physical exam at discharge:  Mental status: Awake, alert and oriented to person, place, and time. Naming, repetition and comprehension intact.  Attention/concentration intact.  No dysarthria, no aphasia.  Fund of knowledge appropriate.    Cranial nerves:   - Eyes: PERRL, EOMI without nystagmus, Visual fields full   - Face: BL V1-V3 sensation intact symmetrically, face symmetric   - ENT: Hearing intact to voice, palate elevation symmetric, tongue was midline  Motor: No drift in all 4 extremities, 5/5 NIKIA&LE. Normal tone and bulk.  No abnormal movements.    Sensation: Intact to light touch , no extinction   Coordination: No dysmetria on finger-to-nose and heel-to-shin.  No dysdiadokinesia.

## 2024-11-01 NOTE — PROGRESS NOTE ADULT - ASSESSMENT
53 yo right-handed female w/ HTN in pregnancy, HLD, p/w new onset witnessed GTCS with multiple areas of hypoattenuation on CT suspect provoked seizure. vEEG with R PLEDs and MRI w/ subacute R temporal hematoma with recent CTA w/o LVO. Has evidence of chronic microhemorrhages suspicious for vasculitis vs undiagnosed HTN. CTV pending to r/o sinus thrombosis given location of hematoma. On exam, patient with some paraphrasic errors and visual disturbance. Will discuss possible DSA with BRAYN pending CTV results. Continue vEEG for now.     #subacute R temporal hematoma  - Change neuro checks to q8hrs  - Maintain normotension, c/w nifedipine 60mg qHS  - f/u CT venogram report  - Speech Therapy/OT/PT Evaluation   - f/u neuroIR (Dr. Murry)   - No anti-platelets/anti coagulants at this time   - f/u LABS: ANCA, AHSAN, ESR/CRP, Beta 2 glycoprotein, MMA, homocysteine, thyroid autoantibodies, SSA/SSB, ACE level, Complement levels (CH50, C3, C4)     #new onset GTCS likely i/s/o R. temporal hematoma  - continue Keppra 1g q12h  - seizure precautions  - continue vEEG    #HLD  - obtain lipid panel    #HTN  In the ED, BP was elevated and was seen and she was given IV labetalol   - BP goal: normotension  - c/w nifedipine 60mg qHS    #Misc  F: NI  E: replete K<4, Mg<2  N: DASH  VTE Prophylaxis: SCDs  GI: NI  Activity: IAT  C: Full Code  D: Neurology Remote Tele 55 yo right-handed female w/ HTN in pregnancy, HLD, p/w new onset witnessed GTCS with multiple areas of hypoattenuation on CT suspect provoked seizure. vEEG with R PLEDs and MRI w/ subacute R temporal hematoma with recent CTA w/o LVO. Has evidence of chronic microhemorrhages suspicious for vasculitis vs undiagnosed HTN. Transferred to [place] for DSA with BRYAN. CTV neg for sinus thrombosis. EEG captured a 25 sec seizure overnight 10/31. On exam, patient at baseline. Planning for DSA on Sunday.     #subacute R temporal hematoma  - neuro checks to q8hrs  - maintain normotension, c/w nifedipine 60mg qHS  - plan for DSA on Sunday  - No anti-platelets/anti coagulants at this time   - f/u LABS: ANCA, AHSAN, ESR/CRP, Beta 2 glycoprotein, MMA, homocysteine, thyroid autoantibodies, SSA/SSB, ACE level, Complement levels (CH50, C3, C4)   - PT/OT    #new onset GTCS likely i/s/o R. temporal hematoma  EEG showed 25-second seizure overnight 10/31.  - increase Keppra to 1g qAM and 1250mg qHS   - seizure precautions    #HLD  - obtain lipid panel    #HTN  In the ED, BP was elevated and was seen and she was given IV labetalol   - BP goal: normotension  - c/w nifedipine 60mg qHS    #Misc  F: NI  E: replete K<4, Mg<2  N: DASH  VTE Prophylaxis: SCDs  GI: NI  Activity: IAT  C: Full Code  D: Neurology Floor

## 2024-11-01 NOTE — PROGRESS NOTE ADULT - ASSESSMENT
54-year-old female with no significant past medical history comes to the hospital with first witnessed seizure.  she was admitted to [facility] and now transfserred to [place] for further w/u.    #GTC Seizure--new onset  #subacute R temporal lobe hematoma 2.3 cm with diffuse microhemorrhages  #HTN- new onset  -continue managment as per the neurology team---medicine following for comanagement  -f/u neurology IR with plan for DSA  -continue neurochecks q6hr  -continue keppra 1000 mg q12 hr---monitor level  -f/u VEEG results  -pending CT venogram  -nifedipine xl 60 mg daily started for new onset HTN---bp better controlled---dc ivf when no longer npo   -monitor electrolytes---mg >2  -fall precautions  -monitor vs closely---remains afebrile and hemodynamically stable     DVT/GI ppx    FULL CODE    Total time spent to complete patient's bedside assessment, review medical chart, discuss medical plan of care with covering medical team was more than 35 minutes  with >50% of time spendt face to face with patient, discussion with patient/family and/or coordination of care    please contact me via teams or [employer] #[phone] for any additional questions    thank you for this consult and the hospitalist group will continue to follow the patient during hospitalization

## 2024-11-02 LAB
AFP-TM SERPL-MCNC: 3 NG/ML — SIGNIFICANT CHANGE UP
ANION GAP SERPL CALC-SCNC: 15 MMOL/L — HIGH (ref 7–14)
BASOPHILS # BLD AUTO: 0.06 K/UL — SIGNIFICANT CHANGE UP (ref 0–0.2)
BASOPHILS NFR BLD AUTO: 0.7 % — SIGNIFICANT CHANGE UP (ref 0–1)
BUN SERPL-MCNC: 12 MG/DL — SIGNIFICANT CHANGE UP (ref 10–20)
CALCIUM SERPL-MCNC: 9.1 MG/DL — SIGNIFICANT CHANGE UP (ref 8.4–10.5)
CANCER AG19-9 SERPL-ACNC: 4 U/ML — SIGNIFICANT CHANGE UP
CEA SERPL-MCNC: 1.1 NG/ML — SIGNIFICANT CHANGE UP (ref 0–3.8)
CHLORIDE SERPL-SCNC: 104 MMOL/L — SIGNIFICANT CHANGE UP (ref 98–110)
CO2 SERPL-SCNC: 22 MMOL/L — SIGNIFICANT CHANGE UP (ref 17–32)
CREAT SERPL-MCNC: 0.7 MG/DL — SIGNIFICANT CHANGE UP (ref 0.7–1.5)
EGFR: 103 ML/MIN/1.73M2 — SIGNIFICANT CHANGE UP
EOSINOPHIL # BLD AUTO: 0.39 K/UL — SIGNIFICANT CHANGE UP (ref 0–0.7)
EOSINOPHIL NFR BLD AUTO: 4.9 % — SIGNIFICANT CHANGE UP (ref 0–8)
GLUCOSE SERPL-MCNC: 101 MG/DL — HIGH (ref 70–99)
HCT VFR BLD CALC: 45.2 % — SIGNIFICANT CHANGE UP (ref 37–47)
HGB BLD-MCNC: 14.5 G/DL — SIGNIFICANT CHANGE UP (ref 12–16)
IMM GRANULOCYTES NFR BLD AUTO: 0.4 % — HIGH (ref 0.1–0.3)
LYMPHOCYTES # BLD AUTO: 2.04 K/UL — SIGNIFICANT CHANGE UP (ref 1.2–3.4)
LYMPHOCYTES # BLD AUTO: 25.5 % — SIGNIFICANT CHANGE UP (ref 20.5–51.1)
MAGNESIUM SERPL-MCNC: 2.1 MG/DL — SIGNIFICANT CHANGE UP (ref 1.8–2.4)
MCHC RBC-ENTMCNC: 28.2 PG — SIGNIFICANT CHANGE UP (ref 27–31)
MCHC RBC-ENTMCNC: 32.1 G/DL — SIGNIFICANT CHANGE UP (ref 32–37)
MCV RBC AUTO: 87.9 FL — SIGNIFICANT CHANGE UP (ref 81–99)
MONOCYTES # BLD AUTO: 0.81 K/UL — HIGH (ref 0.1–0.6)
MONOCYTES NFR BLD AUTO: 10.1 % — HIGH (ref 1.7–9.3)
NEUTROPHILS # BLD AUTO: 4.68 K/UL — SIGNIFICANT CHANGE UP (ref 1.4–6.5)
NEUTROPHILS NFR BLD AUTO: 58.4 % — SIGNIFICANT CHANGE UP (ref 42.2–75.2)
NRBC # BLD: 0 /100 WBCS — SIGNIFICANT CHANGE UP (ref 0–0)
PLATELET # BLD AUTO: 325 K/UL — SIGNIFICANT CHANGE UP (ref 130–400)
PMV BLD: 10.1 FL — SIGNIFICANT CHANGE UP (ref 7.4–10.4)
POTASSIUM SERPL-MCNC: 4.1 MMOL/L — SIGNIFICANT CHANGE UP (ref 3.5–5)
POTASSIUM SERPL-SCNC: 4.1 MMOL/L — SIGNIFICANT CHANGE UP (ref 3.5–5)
RBC # BLD: 5.14 M/UL — SIGNIFICANT CHANGE UP (ref 4.2–5.4)
RBC # FLD: 13 % — SIGNIFICANT CHANGE UP (ref 11.5–14.5)
SODIUM SERPL-SCNC: 141 MMOL/L — SIGNIFICANT CHANGE UP (ref 135–146)
WBC # BLD: 8.01 K/UL — SIGNIFICANT CHANGE UP (ref 4.8–10.8)
WBC # FLD AUTO: 8.01 K/UL — SIGNIFICANT CHANGE UP (ref 4.8–10.8)

## 2024-11-02 PROCEDURE — 99232 SBSQ HOSP IP/OBS MODERATE 35: CPT

## 2024-11-02 RX ORDER — LEVETIRACETAM 500 MG/1
1250 TABLET, FILM COATED ORAL AT BEDTIME
Refills: 0 | Status: DISCONTINUED | OUTPATIENT
Start: 2024-11-02 | End: 2024-11-04

## 2024-11-02 RX ADMIN — LEVETIRACETAM 1000 MILLIGRAM(S): 500 TABLET, FILM COATED ORAL at 12:01

## 2024-11-02 RX ADMIN — LEVETIRACETAM 1250 MILLIGRAM(S): 500 TABLET, FILM COATED ORAL at 21:44

## 2024-11-02 RX ADMIN — Medication 60 MILLIGRAM(S): at 12:01

## 2024-11-02 NOTE — PROGRESS NOTE ADULT - SUBJECTIVE AND OBJECTIVE BOX
Neurology Progress Note    Interval History:    Patient has no complaints today. No overnight events.    Medications:  levETIRAcetam 1250 milliGRAM(s) Oral daily  levETIRAcetam 1000 milliGRAM(s) Oral daily  lidocaine 2% Viscous 5 milliLiter(s) Swish and Spit three times a day PRN  LORazepam   Injectable 2 milliGRAM(s) IV Push once PRN  NIFEdipine XL 60 milliGRAM(s) Oral <User Schedule>  sodium chloride 0.9%. 1000 milliLiter(s) IV Continuous <Continuous>      Vital Signs Last 24 Hrs  T(C): 36.9 (02 Nov 2024 05:20), Max: 37.1 (01 Nov 2024 20:33)  T(F): 98.5 (02 Nov 2024 05:20), Max: 98.7 (01 Nov 2024 20:33)  HR: 97 (02 Nov 2024 05:20) (91 - 98)  BP: 143/84 (02 Nov 2024 05:20) (143/84 - 160/70)  BP(mean): 104 (02 Nov 2024 05:20) (100 - 108)  RR: 18 (02 Nov 2024 05:20) (18 - 18)  SpO2: 98% (02 Nov 2024 05:20) (96% - 98%)    Parameters below as of 02 Nov 2024 05:20  Patient On (Oxygen Delivery Method): room air      Neurological Examination:  General:  Appearance is consistent with chronologic age.   Cognitive/Language:  Awake, alert, and oriented to person, place, time and date.  Recent and remote memory intact.  Fund of knowledge is appropriate.  Naming, repetition and comprehension intact. Nondysarthric.    Cranial Nerves  - Eyes: Visual fields full.  EOMI w/o nystagmus, skew or reported double vision.  PERRL.  No ptosis/weakness of eyelid closure.    - Face:  Facial sensation normal V1 - 3, no facial asymmetry.    - Ears/Nose/Throat:  Hearing grossly intact b/l to finger rub.  Palate elevates midline.  Tongue and uvula midline.   Motor exam: Normal tone and bulk. No tenderness, twitching, tremors or involuntary movements.            Upper extremity                  Bicep     Tricep     HG                                                 R      5/5        5/5          5/5                                                    L       5/5        5/5          5/5              Lower extremity                   HF        KE        DF         PF                                                  R     5/5       5/5       5/5       5/5                                               L      5/5      5/5       5/5        5/5    Sensory examination:  Intact to light touch and pinprick, pain, temperature and proprioception and vibration in all extremities.  Reflexes: 2+ b/l biceps, triceps, patella and achilles.  Plantar response downgoing b/l.  Kayla, clonus absent.  Cerebellum: FTN/HKS intact.  No dysmetria.    Gait narrow based    Labs:  CBC Full  -  ( 02 Nov 2024 06:33 )  WBC Count : 8.01 K/uL  RBC Count : 5.14 M/uL  Hemoglobin : 14.5 g/dL  Hematocrit : 45.2 %  Platelet Count - Automated : 325 K/uL  Mean Cell Volume : 87.9 fL  Mean Cell Hemoglobin : 28.2 pg  Mean Cell Hemoglobin Concentration : 32.1 g/dL  Auto Neutrophil # : 4.68 K/uL  Auto Lymphocyte # : 2.04 K/uL  Auto Monocyte # : 0.81 K/uL  Auto Eosinophil # : 0.39 K/uL  Auto Basophil # : 0.06 K/uL  Auto Neutrophil % : 58.4 %  Auto Lymphocyte % : 25.5 %  Auto Monocyte % : 10.1 %  Auto Eosinophil % : 4.9 %  Auto Basophil % : 0.7 %    11-02    141  |  104  |  12  ----------------------------<  101[H]  4.1   |  22  |  0.7    Ca    9.1      02 Nov 2024 06:33  Mg     2.1     11-02    Urinalysis Basic - ( 02 Nov 2024 06:33 )    Color: x / Appearance: x / SG: x / pH: x  Gluc: 101 mg/dL / Ketone: x  / Bili: x / Urobili: x   Blood: x / Protein: x / Nitrite: x   Leuk Esterase: x / RBC: x / WBC x   Sq Epi: x / Non Sq Epi: x / Bacteria: x          A1c 5.7        TSH 3.77    RADIOLOGY & ADDITIONAL TESTS:  CTH: Decreased attenuation of periventricular and subcortical white matter, nonspecific in 54-year-old patient, and considerations include inflammatory and demyelinating diseases such as multiple sclerosis. Confluent regional decreased attenuation at right temporal lobe with loss   of gray-white junction, which could represent area of early ischemic change.  CTA: No large vessel occlusion, aneurysm, or vascular malformation. Scattered areas of diminished density, white matter lesions.  CTV: No evidence for venous sinus thrombosis  MRI brain w/ contrast: Late-subacute parenchymal hematoma within the right temporal lobe measuring about 2.3 cm with mild surrounding edema. No nodular or masslike enhancement. Mild-moderate white matter disease with corpus callosum involvement, nonspecific. Diagnostic considerations include chronic microvascular changes, demyelination and gliosis. Numerous microhemorrhages throughout the brain which can reflect sequela of hypertensive microhemorrhage, amyloid angiopathy, or prior inflammation/infection.

## 2024-11-02 NOTE — PROGRESS NOTE ADULT - SUBJECTIVE AND OBJECTIVE BOX
Patient is a 54y old  Female who presents with a chief complaint of GTCS, R temporal hematoma (31 Oct 2024 07:33)    HPI:  54-year-old white female with no significant past medical history comes to the hospital with first witnessed seizure as per witness patient was complaining of feeling tired woke up stared at the nila and had a generalized seizure and lost bladder function patient denies alcohol abuse or drug abuse or prior history of seizures, any fevers. in the emergency room blood patient's blood pressure was elevated and was seen and she was given IV labetalol.  No recurring seizure since  Pt is back to her base line.  Imaging in ER shows ischemic changes and white matter abnormalities. MRI recommended to r/o demyelinating disease. vs inflammatory process.   Neurology was consulted recommendations to admit to ICU stepdown with general neurochecks every 4 hours (29 Oct 2024 04:29)    PAST MEDICAL & SURGICAL HISTORY:  No pertinent past medical history      patient seen and examined independently on morning rounds, chart reviewed and discussed with the neurology team:   hospital day #4  [facility]   awaiting neuroIR cerebral angiogram (planning for tomorrow sunday depending on availability)               PE:  GEN-NAD, AAOx3, resting comfortably  HEENT- NCAT, PERRLA, EOMI  PULM- Clear to auscultation bilaterally, fair air entry  CVS- +s1/s2 RRR no murmurs  GI- soft NT ND +bs, no rebound, no guarding  EXT- no edema        Labs:                        14.5   8.01  )-----------( 325      ( 02 Nov 2024 06:33 )             45.2     CBC Full  -  ( 02 Nov 2024 06:33 )  WBC Count : 8.01 K/uL  RBC Count : 5.14 M/uL  Hemoglobin : 14.5 g/dL  Hematocrit : 45.2 %  Platelet Count - Automated : 325 K/uL  Mean Cell Volume : 87.9 fL  Mean Cell Hemoglobin : 28.2 pg  Mean Cell Hemoglobin Concentration : 32.1 g/dL  Auto Neutrophil # : 4.68 K/uL  Auto Lymphocyte # : 2.04 K/uL  Auto Monocyte # : 0.81 K/uL  Auto Eosinophil # : 0.39 K/uL  Auto Basophil # : 0.06 K/uL  Auto Neutrophil % : 58.4 %  Auto Lymphocyte % : 25.5 %  Auto Monocyte % : 10.1 %  Auto Eosinophil % : 4.9 %  Auto Basophil % : 0.7 %      11-02    141  |  104  |  12  ----------------------------<  101[H]  4.1   |  22  |  0.7    Ca    9.1      02 Nov 2024 06:33  Mg     2.1     11-02        Microbiology:      Vital Signs Last 24 Hrs  T(C): 36.9 (02 Nov 2024 05:20), Max: 37.1 (01 Nov 2024 20:33)  T(F): 98.5 (02 Nov 2024 05:20), Max: 98.7 (01 Nov 2024 20:33)  HR: 97 (02 Nov 2024 05:20) (91 - 98)  BP: 143/84 (02 Nov 2024 05:20) (143/84 - 160/70)  BP(mean): 104 (02 Nov 2024 05:20) (100 - 108)  RR: 18 (02 Nov 2024 05:20) (18 - 18)  SpO2: 98% (02 Nov 2024 05:20) (96% - 98%)    Parameters below as of 02 Nov 2024 05:20  Patient On (Oxygen Delivery Method): room air        I&O's Summary    MEDICATIONS  (STANDING):  levETIRAcetam 1000 milliGRAM(s) Oral daily  levETIRAcetam 1250 milliGRAM(s) Oral at bedtime  NIFEdipine XL 60 milliGRAM(s) Oral <User Schedule>  sodium chloride 0.9%. 1000 milliLiter(s) (75 mL/Hr) IV Continuous <Continuous>    MEDICATIONS  (PRN):  lidocaine 2% Viscous 5 milliLiter(s) Swish and Spit three times a day PRN Mouth Care  LORazepam   Injectable 2 milliGRAM(s) IV Push once PRN Seizure Activity

## 2024-11-02 NOTE — PROGRESS NOTE ADULT - ASSESSMENT
53 yo right-handed female w/ HTN in pregnancy, HLD, p/w new onset witnessed GTCS with multiple areas of hypoattenuation on CT suspect provoked seizure. vEEG with R PLEDs and MRI w/ subacute R temporal hematoma with recent CTA w/o LVO. Has evidence of chronic microhemorrhages suspicious for vasculitis vs undiagnosed HTN. Transferred to [place] for DSA with BRYAN. CTV neg for sinus thrombosis. EEG captured a 25 sec seizure overnight 10/31. On exam, patient at baseline. Planning for DSA on Sunday.     #subacute R temporal hematoma  - neuro checks to q8hrs  - maintain normotension, c/w nifedipine 60mg qHS  - plan for DSA on Sunday  - No anti-platelets/anti coagulants at this time   - f/u LABS: ANCA, AHSAN, ESR/CRP, Beta 2 glycoprotein, MMA, homocysteine, thyroid autoantibodies, SSA/SSB, ACE level, Complement levels (CH50, C3, C4)   - PT/OT    #new onset GTCS likely i/s/o R. temporal hematoma  EEG showed 25-second seizure overnight 10/31.  - c/w Keppra 1g qAM and 1250mg qHS   - seizure precautions    #HLD  - obtain lipid panel    #HTN  In the ED, BP was elevated and was seen and she was given IV labetalol   - BP goal: normotension  - c/w nifedipine 60mg qHS    #Misc  F: NI  E: replete K<4, Mg<2  N: DASH  VTE Prophylaxis: SCDs  GI: NI  Activity: IAT  C: Full Code  D: Neurology Floor

## 2024-11-02 NOTE — PROGRESS NOTE ADULT - ASSESSMENT
54-year-old female with no significant past medical history comes to the hospital with first witnessed seizure.  she was admitted to [facility] and now transfserred to [place] for further w/u.    #GTC Seizure--new onset  #subacute R temporal lobe hematoma 2.3 cm with diffuse microhemorrhages  #HTN- new onset  -continue managment as per the neurology team---medicine following for comanagement  -f/u neurology IR with plan for CT venogram and DSA---antiicpate for tomorrow 11/3---keep npo after midnight and check pre-op labs  -continue neurochecks q6hr  -continue keppra 1000 mg q12 hr---monitor level  -f/u VEEG results  -nifedipine xl 60 mg daily started for new onset HTN---bp better controlled   -monitor electrolytes---mg >2  -fall precautions  -monitor vs closely---remains afebrile and hemodynamically stable     DVT/GI ppx    FULL CODE    pcp- Dr. Doss    Total time spent to complete patient's bedside assessment, review medical chart, discuss medical plan of care with covering medical team was more than 35 minutes  with >50% of time spendt face to face with patient, discussion with patient/family and/or coordination of care    please contact me via teams or [employer] #[phone] for any additional questions    thank you for this consult and the hospitalist group will continue to follow the patient during hospitalization

## 2024-11-03 LAB
ANION GAP SERPL CALC-SCNC: 13 MMOL/L — SIGNIFICANT CHANGE UP (ref 7–14)
BASOPHILS # BLD AUTO: 0.06 K/UL — SIGNIFICANT CHANGE UP (ref 0–0.2)
BASOPHILS NFR BLD AUTO: 0.8 % — SIGNIFICANT CHANGE UP (ref 0–1)
BUN SERPL-MCNC: 15 MG/DL — SIGNIFICANT CHANGE UP (ref 10–20)
CALCIUM SERPL-MCNC: 9.2 MG/DL — SIGNIFICANT CHANGE UP (ref 8.4–10.4)
CHLORIDE SERPL-SCNC: 102 MMOL/L — SIGNIFICANT CHANGE UP (ref 98–110)
CO2 SERPL-SCNC: 24 MMOL/L — SIGNIFICANT CHANGE UP (ref 17–32)
CREAT SERPL-MCNC: 0.8 MG/DL — SIGNIFICANT CHANGE UP (ref 0.7–1.5)
EGFR: 88 ML/MIN/1.73M2 — SIGNIFICANT CHANGE UP
EOSINOPHIL # BLD AUTO: 0.36 K/UL — SIGNIFICANT CHANGE UP (ref 0–0.7)
EOSINOPHIL NFR BLD AUTO: 4.9 % — SIGNIFICANT CHANGE UP (ref 0–8)
GLUCOSE SERPL-MCNC: 97 MG/DL — SIGNIFICANT CHANGE UP (ref 70–99)
HCT VFR BLD CALC: 45.3 % — SIGNIFICANT CHANGE UP (ref 37–47)
HGB BLD-MCNC: 14.6 G/DL — SIGNIFICANT CHANGE UP (ref 12–16)
IMM GRANULOCYTES NFR BLD AUTO: 0.4 % — HIGH (ref 0.1–0.3)
LYMPHOCYTES # BLD AUTO: 1.85 K/UL — SIGNIFICANT CHANGE UP (ref 1.2–3.4)
LYMPHOCYTES # BLD AUTO: 25.1 % — SIGNIFICANT CHANGE UP (ref 20.5–51.1)
MAGNESIUM SERPL-MCNC: 2.2 MG/DL — SIGNIFICANT CHANGE UP (ref 1.8–2.4)
MCHC RBC-ENTMCNC: 28.2 PG — SIGNIFICANT CHANGE UP (ref 27–31)
MCHC RBC-ENTMCNC: 32.2 G/DL — SIGNIFICANT CHANGE UP (ref 32–37)
MCV RBC AUTO: 87.6 FL — SIGNIFICANT CHANGE UP (ref 81–99)
MONOCYTES # BLD AUTO: 0.76 K/UL — HIGH (ref 0.1–0.6)
MONOCYTES NFR BLD AUTO: 10.3 % — HIGH (ref 1.7–9.3)
NEUTROPHILS # BLD AUTO: 4.32 K/UL — SIGNIFICANT CHANGE UP (ref 1.4–6.5)
NEUTROPHILS NFR BLD AUTO: 58.5 % — SIGNIFICANT CHANGE UP (ref 42.2–75.2)
NRBC # BLD: 0 /100 WBCS — SIGNIFICANT CHANGE UP (ref 0–0)
PLATELET # BLD AUTO: 316 K/UL — SIGNIFICANT CHANGE UP (ref 130–400)
PMV BLD: 10 FL — SIGNIFICANT CHANGE UP (ref 7.4–10.4)
POTASSIUM SERPL-MCNC: 4 MMOL/L — SIGNIFICANT CHANGE UP (ref 3.5–5)
POTASSIUM SERPL-SCNC: 4 MMOL/L — SIGNIFICANT CHANGE UP (ref 3.5–5)
RBC # BLD: 5.17 M/UL — SIGNIFICANT CHANGE UP (ref 4.2–5.4)
RBC # FLD: 12.9 % — SIGNIFICANT CHANGE UP (ref 11.5–14.5)
SODIUM SERPL-SCNC: 139 MMOL/L — SIGNIFICANT CHANGE UP (ref 135–146)
WBC # BLD: 7.38 K/UL — SIGNIFICANT CHANGE UP (ref 4.8–10.8)
WBC # FLD AUTO: 7.38 K/UL — SIGNIFICANT CHANGE UP (ref 4.8–10.8)

## 2024-11-03 PROCEDURE — 36227 PLACE CATH XTRNL CAROTID: CPT | Mod: 50

## 2024-11-03 PROCEDURE — 99232 SBSQ HOSP IP/OBS MODERATE 35: CPT

## 2024-11-03 PROCEDURE — 76937 US GUIDE VASCULAR ACCESS: CPT | Mod: 26,59

## 2024-11-03 PROCEDURE — 76377 3D RENDER W/INTRP POSTPROCES: CPT | Mod: 26

## 2024-11-03 PROCEDURE — 36226 PLACE CATH VERTEBRAL ART: CPT | Mod: RT

## 2024-11-03 PROCEDURE — 36224 PLACE CATH CAROTD ART: CPT | Mod: 50

## 2024-11-03 RX ADMIN — Medication 60 MILLIGRAM(S): at 14:59

## 2024-11-03 RX ADMIN — LEVETIRACETAM 1000 MILLIGRAM(S): 500 TABLET, FILM COATED ORAL at 14:59

## 2024-11-03 RX ADMIN — LEVETIRACETAM 1250 MILLIGRAM(S): 500 TABLET, FILM COATED ORAL at 21:26

## 2024-11-03 NOTE — PRE PROCEDURE NOTE - PRE PROCEDURE EVALUATION
Interventional Neuro Radiology  Pre-Procedure Note PA-C    HPI:  54-year-old female with no significant PMHx who presented s/p generalized seizure and was found to have a  late-subacute parenchymal hematoma within the right temporal lobe (~2.3cm) and numerous micro hemorrhages on MRI. The patient was transferred to [facility] for further evaluation. On exam this morning she has no acute complaints. The patient denies current headache, nausea/vomiting, and dizziness. The patient presents today for a diagnostic cerebral angiogram to evaluate the above findings. NPO except medication after midnight; IV normal saline on arrival.    Allergies: No Known Allergies    PAST MEDICAL & SURGICAL HISTORY:  No pertinent past medical history    Current Medications: levETIRAcetam 1000 milliGRAM(s) Oral daily  levETIRAcetam 1250 milliGRAM(s) Oral at bedtime  lidocaine 2% Viscous 5 milliLiter(s) Swish and Spit three times a day PRN  LORazepam   Injectable 2 milliGRAM(s) IV Push once PRN  NIFEdipine XL 60 milliGRAM(s) Oral <User Schedule>  sodium chloride 0.9%. 1000 milliLiter(s) IV Continuous <Continuous>      Labs:                         14.6   7.38  )-----------( 316      ( 03 Nov 2024 06:49 )             45.3       11-03    139  |  102  |  15  ----------------------------<  97  4.0   |  24  |  0.8    Ca    9.2      03 Nov 2024 06:49  Mg     2.2     11-03    Assessment/Plan:   This is a 54y  year old right  hand dominant female who presents for a diagnostic cerebral angiogram to help determine the source of an ICH.   Procedure, goals, risks, benefits and alternatives  were discussed with patient and patient's family.  All questions were answered to best understanding.   Risks discussed include but are not limited to stroke, vessel injury, hemorrhage, and/or hematoma.  Patient demonstrates understanding  of all risks involved with this procedure and wishes to continue.     Appropriate consent was obtained from patient and consent is in the patient's chart.     Interventional Neuro Radiology  Pre-Procedure Note PA-C    HPI:  54-year-old female with no significant PMHx who presented s/p generalized seizure and was found to have a  late-subacute parenchymal hematoma within the right temporal lobe (~2.3cm) and numerous micro hemorrhages on MRI. The patient was transferred to [facility] for further evaluation. On exam this morning she has no acute complaints. The patient denies current headache, nausea/vomiting, and dizziness. The patient presents today for a diagnostic cerebral angiogram to evaluate the above findings. NPO except medication after midnight; IV normal saline on arrival. NIHSS 0    Allergies: No Known Allergies    PAST MEDICAL & SURGICAL HISTORY:  No pertinent past medical history    Current Medications: levETIRAcetam 1000 milliGRAM(s) Oral daily  levETIRAcetam 1250 milliGRAM(s) Oral at bedtime  lidocaine 2% Viscous 5 milliLiter(s) Swish and Spit three times a day PRN  LORazepam   Injectable 2 milliGRAM(s) IV Push once PRN  NIFEdipine XL 60 milliGRAM(s) Oral <User Schedule>  sodium chloride 0.9%. 1000 milliLiter(s) IV Continuous <Continuous>      Labs:                         14.6   7.38  )-----------( 316      ( 03 Nov 2024 06:49 )             45.3       11-03    139  |  102  |  15  ----------------------------<  97  4.0   |  24  |  0.8    Ca    9.2      03 Nov 2024 06:49  Mg     2.2     11-03    Assessment/Plan:   This is a 54y  year old right  hand dominant female who presents for a diagnostic cerebral angiogram to help determine the source of an ICH.   Procedure, goals, risks, benefits and alternatives  were discussed with patient and patient's family.  All questions were answered to best understanding.   Risks discussed include but are not limited to stroke, vessel injury, hemorrhage, and/or hematoma.  Patient demonstrates understanding  of all risks involved with this procedure and wishes to continue.     Appropriate consent was obtained from patient and consent is in the patient's chart.

## 2024-11-03 NOTE — CHART NOTE - NSCHARTNOTEFT_GEN_A_CORE
PACU ANESTHESIA ADMISSION NOTE      Procedure: Diagnostic cerebral angiogram  Post op diagnosis:  ICH    ____  Intubated  TV:______       Rate: ______      FiO2: ______    __x__  Patent Airway    _x___  Full return of protective reflexes    _x___  Full recovery from anesthesia / back to baseline     Vitals:   T:  98.1F         R:    18              BP:   130/67               Sat:   97%                P: 92      Mental Status:  _x___ Awake   __x___ Alert   _____ Drowsy   _____ Sedated    Nausea/Vomiting:  __x__ NO  ______Yes,   See Post - Op Orders          Pain Scale (0-10):  _0/10____    Treatment: ____ None    __x__ See Post - Op/PCA Orders    Post - Operative Fluids:   ____ Oral   ___x_ See Post - Op Orders    Plan: Discharge:   ____Home       _____Floor     __x___Critical Care    _____  Other:_________________    Comments: Pt tolerated procedure well, no anesthesia related complications. Care of pt endorsed to PACU, report given to PACU RN. Discharge to the next level of care when criteria are met.

## 2024-11-03 NOTE — PROGRESS NOTE ADULT - SUBJECTIVE AND OBJECTIVE BOX
Neurology Progress Note     YOLANDA SANTILLAN 54y Female [id number]    Hospital Day: 5d     Overnight events:  None    Subjective complaints:  Pt evaluated at bedside. Pt has no acute complaints.     Vital Signs  T(F): 98.2 (10:10), Max: 98.7 (13:05)  HR: 103 (10:10) (55 - 103)  BP: 169/93 (10:10) (123/63 - 169/93)  RR: 22 (10:10) (17 - 22)  SpO2: 94% (10:10) (94% - 99%)    Neurological Exam:   Mental status: Awake, alert and oriented to person, place, and time. Naming, repetition and comprehension intact.  Attention/concentration intact.  No dysarthria, no aphasia.  Fund of knowledge appropriate.    Cranial nerves:   - Eyes: PERRL, EOMI without nystagmus, Visual fields full   - Face: BL V1-V3 sensation intact symmetrically, face symmetric   - ENT: Hearing intact to voice, palate elevation symmetric, tongue was midline  Motor: No drift in all 4 extremities, 5/5 NIKIA&LE. Normal tone and bulk.  No abnormal movements.    Sensation: Intact to light touch , no extinction   Coordination: No dysmetria on finger-to-nose and heel-to-shin.  No dysdiadokinesia.      Labs:  WBC 7.38 /HGB 14.6 /MCV 87.6 /HCT 45.3 / / 11-03  WBC 8.01 /HGB 14.5 /MCV 87.9 /HCT 45.2 / / 11-02    11-03    139  |  102  |  15  ----------------------------<  97  4.0   |  24  |  0.8    Ca    9.2      03 Nov 2024 06:49  Mg     2.2     11-03          Urinalysis Basic - ( 03 Nov 2024 06:49 )    Color: x / Appearance: x / SG: x / pH: x  Gluc: 97 mg/dL / Ketone: x  / Bili: x / Urobili: x   Blood: x / Protein: x / Nitrite: x   Leuk Esterase: x / RBC: x / WBC x   Sq Epi: x / Non Sq Epi: x / Bacteria: x        Medications:  levETIRAcetam 1000 milliGRAM(s) Oral daily  levETIRAcetam 1250 milliGRAM(s) Oral at bedtime  lidocaine 2% Viscous 5 milliLiter(s) Swish and Spit three times a day PRN  LORazepam   Injectable 2 milliGRAM(s) IV Push once PRN  NIFEdipine XL 60 milliGRAM(s) Oral <User Schedule>  sodium chloride 0.9%. 1000 milliLiter(s) IV Continuous <Continuous>      Neuroimaging:      PERTINENT HISTORICAL RESULTS:

## 2024-11-03 NOTE — BRIEF OPERATIVE NOTE - COMMENTS
Post operative monitoring of vitals, NIHSS, radial pulses, and access site as ordered  Avoid hypertension  Follow up with Dr. Murry for management of possible cavernomas and procedural review

## 2024-11-03 NOTE — PRE-OP CHECKLIST - AS TEMP SITE
Patient went to [facility] for evaluation of symptoms.   
Regarding: [place]  Been having cold symptoms for last 3 weeks runny nose congestion  ----- Message from Alona Quispe sent at 12/30/2022  9:18 AM CST -----  Patient Name: No Hill    Specialist or PCP Name: Dr Rocael Alvares    Symptoms:  Been having cold symptoms for last 3 weeks runny nose congestion    Pregnant (females aged 13-60. If Yes, how long?) : No    Call Back # : [phone]    Which State are you currently located in?: [place]    Name of Clinic Site / Acct# : [email or web address]    Use following scripting for patients waiting for a callback:   \"Nurse callback times vary based on call volumes; please be aware the return phone call may come from an unidentified phone number. If your symptoms worsen or become life threatening while waiting, you should seek immediate assistance by calling 911 or going to the ER for evaluation.\"    
Symptoms:  Been having cold symptoms for last 3 weeks runny nose congestion    Onset: 3 weeks   Location / description: nasal congestion and cough   Precipitating Factors: not sure   Pain Scale (1-10), no  Associated Symptoms: runny nose, eyes watery   What improves / worsens symptoms: not sure   Symptom specific medications: no  LMP : No LMP recorded (lmp unknown). Patient is postmenopausal.  Are you pregnant or breast feeding: n/a  Recent visits (last 3-4 weeks) for same reason or recent surgery: no    PLAN:   Home Care Advice provided and Directed to Urgent Care    Patient/Caller agrees to follow recommendations.    Reason for Disposition  • Cough with cold symptoms (e.g., runny nose, postnasal drip, throat clearing)    Protocols used: COUGH - ACUTE NON-PRODUCTIVE-A-AH  Patient is going to [facility]     
oral
oral

## 2024-11-03 NOTE — BRIEF OPERATIVE NOTE - OPERATION/FINDINGS
Procedure: Diagnostic Cerebral Angiogram   Contrast: Visipaque 320   IA medications: Heparin 3000 IU, Verapamil 2.5mg, Nitroglycerin 200 mcg  Implants placed: None  Complications: None    Preliminary Report:  A selective diagnostic cerebral angiogram was performed. On preliminary review, no sign of arteriovenous malformation, fistula, aneurysm, or other irregularity was noted. Right transverse sinus narrowing of a nature unlikely to have resulted in hemorrhage was observed. A right radial arteriotomy site was used for procedural access and a TR band was used for closure post procedure.     Please continue to monitor the arteriotomy site for signs of hematoma, bleeding, infection and for diminished or absent distal pulses or temperature/ color changes. Notify a provider if any of the above is noticed or with any additional questions/ concerns.   NIHSS pre procedure: 0  NIHSS post procedure: 0  Extremity: RUE with +mild tenderness to palpation proximal to the arteriotomy site; no temperature/ color changes or hematoma observed; radial pulses intact.    Official IR neuro procedure note is to follow.

## 2024-11-03 NOTE — PROGRESS NOTE ADULT - ASSESSMENT
55 yo right-handed female w/ HTN in pregnancy, HLD, p/w new onset witnessed GTCS with multiple areas of hypoattenuation on CT suspect provoked seizure. vEEG with R PLEDs and MRI w/ subacute R temporal hematoma with recent CTA w/o LVO. Has evidence of chronic microhemorrhages suspicious for vasculitis vs undiagnosed HTN. Transferred to [place] for DSA with BRYAN. CTV neg for sinus thrombosis. EEG captured a 25 sec seizure overnight 10/31. On exam, patient at baseline. Planning for DSA today 11/3.     #subacute R temporal hematoma  - neuro checks to q8hrs  - maintain normotension, c/w nifedipine 60mg qHS  - plan for DSA on Sunday - today  - No anti-platelets/anti coagulants at this time   - f/u LABS: ANCA, AHSAN, ESR/CRP, Beta 2 glycoprotein, MMA, homocysteine, thyroid autoantibodies, SSA/SSB, ACE level, Complement levels (CH50, C3, C4)   - PT/OT    #new onset GTCS likely i/s/o R. temporal hematoma  EEG showed 25-second seizure overnight 10/31.  - c/w Keppra 1g qAM and 1250mg qHS   - seizure precautions    #HLD  - obtain lipid panel    #HTN  In the ED, BP was elevated and was seen and she was given IV labetalol   - BP goal: normotension  - c/w nifedipine 60mg qHS    #Misc  F: NI  E: replete K<4, Mg<2  N: DASH  VTE Prophylaxis: SCDs  GI: NI  Activity: IAT  C: Full Code  D: Neurology Floor

## 2024-11-04 ENCOUNTER — TRANSCRIPTION ENCOUNTER (OUTPATIENT)
Age: 54
End: 2024-11-04

## 2024-11-04 VITALS
HEART RATE: 99 BPM | TEMPERATURE: 98 F | OXYGEN SATURATION: 99 % | RESPIRATION RATE: 20 BRPM | DIASTOLIC BLOOD PRESSURE: 85 MMHG | SYSTOLIC BLOOD PRESSURE: 130 MMHG

## 2024-11-04 PROCEDURE — 99233 SBSQ HOSP IP/OBS HIGH 50: CPT

## 2024-11-04 PROCEDURE — 99232 SBSQ HOSP IP/OBS MODERATE 35: CPT

## 2024-11-04 RX ORDER — LEVETIRACETAM 500 MG/1
1 TABLET, FILM COATED ORAL
Qty: 60 | Refills: 0
Start: 2024-11-04 | End: 2024-12-03

## 2024-11-04 RX ORDER — IOHEXOL 300 MG/ML
30 INJECTION, SOLUTION INTRAVENOUS ONCE
Refills: 0 | Status: DISCONTINUED | OUTPATIENT
Start: 2024-11-04 | End: 2024-11-04

## 2024-11-04 RX ORDER — LEVETIRACETAM 500 MG/1
1 TABLET, FILM COATED ORAL
Qty: 30 | Refills: 0
Start: 2024-11-04 | End: 2024-12-03

## 2024-11-04 RX ORDER — NIFEDIPINE 90 MG
1 TABLET, EXTENDED RELEASE 24 HR ORAL
Qty: 30 | Refills: 0
Start: 2024-11-04 | End: 2024-12-03

## 2024-11-04 RX ADMIN — Medication 60 MILLIGRAM(S): at 12:01

## 2024-11-04 RX ADMIN — LEVETIRACETAM 1000 MILLIGRAM(S): 500 TABLET, FILM COATED ORAL at 12:01

## 2024-11-04 NOTE — PROGRESS NOTE ADULT - SUBJECTIVE AND OBJECTIVE BOX
--------IMCOMPLETE Stroke Progress Note--------    INTERVAL HPI / OVERNIGHT EVENTS:  Overnight, no acute events.  Today, patient was seen and examined.  number ______ used.    Vital Signs Last 24 Hrs  T(C): 36.9 (04 Nov 2024 04:37), Max: 37.2 (03 Nov 2024 21:11)  T(F): 98.4 (04 Nov 2024 04:37), Max: 98.9 (03 Nov 2024 21:11)  HR: 103 (04 Nov 2024 04:37) (55 - 103)  BP: 142/86 (04 Nov 2024 04:37) (115/63 - 169/93)  BP(mean): 103 (03 Nov 2024 14:49) (103 - 103)  RR: 18 (03 Nov 2024 21:11) (17 - 22)  SpO2: 95% (03 Nov 2024 21:11) (94% - 99%)    Parameters below as of 03 Nov 2024 21:11  Patient On (Oxygen Delivery Method): room air        Physical exam:  General: No acute distress, awake and alert  Eyes: Anicteric sclerae, moist conjunctivae, see below for CNs  Neck: trachea midline, FROM, supple, no thyromegaly or lymphadenopathy  Cardiovascular: Regular rate and rhythm, no murmurs, rubs, or gallops. No carotid bruits.   Pulmonary: Anterior breath sounds clear bilaterally, no crackles or wheezing. No use of accessory muscles  GI: Abdomen soft, non-distended, non-tender  Extremities: Radial and DP pulses +2, no edema    Neurologic:  -Mental status: Awake, alert, oriented to person, place, and time. Speech is fluent with intact naming, repetition, and comprehension, no dysarthria. Recent and remote memory intact. Follows commands. Attention/concentration intact. Fund of knowledge appropriate.  -Cranial nerves:   II: Visual fields are full to confrontation.  III, IV, VI: Extraocular movements are intact without nystagmus. Pupils equally round and reactive to light  V:  Facial sensation V1-V3 equal and intact   VII: Face is symmetric with normal eye closure and smile  VIII: Hearing is bilaterally intact to finger rub  IX, X: Uvula is midline and soft palate rises symmetrically  XI: Head turning and shoulder shrug are intact.  XII: Tongue protrudes midline  Motor: Normal bulk and tone. No pronator drift. Strength bilateral upper extremity 5/5, bilateral lower extremities 5/5.  Rapid alternating movements intact and symmetric  Sensation: Intact to light touch bilaterally. No neglect or extinction on double simultaneous testing.  Coordination: No dysmetria on finger-to-nose and heel-to-shin bilaterally  Reflexes: Downgoing toes bilaterally   Gait: Narrow gait and steady    NIHSS:    LABS:                        14.6   7.38  )-----------( 316      ( 03 Nov 2024 06:49 )             45.3     11-03    139  |  102  |  15  ----------------------------<  97  4.0   |  24  |  0.8    Ca    9.2      03 Nov 2024 06:49  Mg     2.2     11-03        Urinalysis Basic - ( 03 Nov 2024 06:49 )    Color: x / Appearance: x / SG: x / pH: x  Gluc: 97 mg/dL / Ketone: x  / Bili: x / Urobili: x   Blood: x / Protein: x / Nitrite: x   Leuk Esterase: x / RBC: x / WBC x   Sq Epi: x / Non Sq Epi: x / Bacteria: x        MEDICATIONS  (STANDING):  levETIRAcetam 1000 milliGRAM(s) Oral daily  levETIRAcetam 1250 milliGRAM(s) Oral at bedtime  NIFEdipine XL 60 milliGRAM(s) Oral <User Schedule>  sodium chloride 0.9%. 1000 milliLiter(s) (75 mL/Hr) IV Continuous <Continuous>    MEDICATIONS  (PRN):  lidocaine 2% Viscous 5 milliLiter(s) Swish and Spit three times a day PRN Mouth Care  LORazepam   Injectable 2 milliGRAM(s) IV Push once PRN Seizure Activity    Allergies    No Known Allergies    Intolerances        RADIOLOGY & ADDITIONAL TESTS:     --------Stroke Progress Note--------    INTERVAL HPI / OVERNIGHT EVENTS:  Overnight, no acute events.  Today, patient was seen and examined. No acute complaint.    Vital Signs Last 24 Hrs  T(C): 36.9 (04 Nov 2024 04:37), Max: 37.2 (03 Nov 2024 21:11)  T(F): 98.4 (04 Nov 2024 04:37), Max: 98.9 (03 Nov 2024 21:11)  HR: 103 (04 Nov 2024 04:37) (55 - 103)  BP: 142/86 (04 Nov 2024 04:37) (115/63 - 169/93)  BP(mean): 103 (03 Nov 2024 14:49) (103 - 103)  RR: 18 (03 Nov 2024 21:11) (17 - 22)  SpO2: 95% (03 Nov 2024 21:11) (94% - 99%)    Parameters below as of 03 Nov 2024 21:11  Patient On (Oxygen Delivery Method): room air        Physical exam:  General: No acute distress, awake and alert  Eyes: Anicteric sclerae, moist conjunctivae, see below for CNs  Neck: trachea midline, FROM, supple, no thyromegaly or lymphadenopathy  Cardiovascular: Regular rate and rhythm, no murmurs, rubs, or gallops. No carotid bruits.   Pulmonary: Anterior breath sounds clear bilaterally, no crackles or wheezing. No use of accessory muscles  GI: Abdomen soft, non-distended, non-tender  Extremities: Radial and DP pulses +2, no edema    Neurologic:  MENTAL STATUS: AAOx3. Able to follow two step commands  LANG/SPEECH: Fluent, repetition intact, Intact comprehension, no dysarthria  CRANIAL NERVES:  II: Normal visual fields. No visual disturbance  III, IV, VI: EOM intact, no gaze preference or deviation  V: normal  VII: no facial asymmetry  VIII: normal hearing to speech  MOTOR: 5/5 in both upper and lower extremities  REFLEXES:  downgoing toes  SENSORY: decreased sensation to pin prick in left arm vs right arm Normal in LEs. No neglect   COORD: Normal finger to nose and heel to shin, no tremor  Gait: deferred due to patient safety    NIHSS: 0    LABS:                        14.6   7.38  )-----------( 316      ( 03 Nov 2024 06:49 )             45.3     11-03    139  |  102  |  15  ----------------------------<  97  4.0   |  24  |  0.8    Ca    9.2      03 Nov 2024 06:49  Mg     2.2     11-03        Urinalysis Basic - ( 03 Nov 2024 06:49 )    Color: x / Appearance: x / SG: x / pH: x  Gluc: 97 mg/dL / Ketone: x  / Bili: x / Urobili: x   Blood: x / Protein: x / Nitrite: x   Leuk Esterase: x / RBC: x / WBC x   Sq Epi: x / Non Sq Epi: x / Bacteria: x        MEDICATIONS  (STANDING):  levETIRAcetam 1000 milliGRAM(s) Oral daily  levETIRAcetam 1250 milliGRAM(s) Oral at bedtime  NIFEdipine XL 60 milliGRAM(s) Oral <User Schedule>  sodium chloride 0.9%. 1000 milliLiter(s) (75 mL/Hr) IV Continuous <Continuous>    MEDICATIONS  (PRN):  lidocaine 2% Viscous 5 milliLiter(s) Swish and Spit three times a day PRN Mouth Care  LORazepam   Injectable 2 milliGRAM(s) IV Push once PRN Seizure Activity    Allergies    No Known Allergies    Intolerances

## 2024-11-04 NOTE — PROGRESS NOTE ADULT - ATTENDING COMMENTS
Patient seen and examined and agree with above except as noted.  Patients history, notes, labs, imaging, vitals and meds reviewed personally.  No new complaints  DSA today  Would obtain CT Chest abdomen pelvis   Send tumor markers    Plan as above
Patient seen and examined and agree with above except as noted.  Patients history, notes, labs, imaging, vitals and meds reviewed personally.  Says she feels almost back to normal  No  seizures documented overnight (clinical)  exam essentially normal    Plan as above  Possible cavernoma however malignancy and other etiologies still not entirely ruled out
In summary,    55 yo woman with HTN, HLD p/w new onset GTC seizures and found to have subacute R temporal hematoma. SBP 180s on arrival. MRI Brain also with multiple microhemorrhages in both subcortical and juxta/cortical regions, no enhancing masses. CTA H/N and DSA without underlying vascular malformations. Patient only notes having mild memory issues when asked about cognitive impairment. Exam unremarkable, 3/3 object recall.     Impression:  1. 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 R temporal ICH: ddx includes cavernoma vs less likely HTN or other underlying lesion    Plan:  - c/w Keppra 1g qAM and 1250mg qHS   - stable for discharge  - repeat MRI Brain w/wo in 6-8 weeks  - CT C/A/P can be done outpatient for malignancy screening  - rest as above
Patient seen and examined and agree with above except as noted.  Patients history, notes, labs, imaging, vitals and meds reviewed personally.    Plan as above
Patient seen and examined and agree with above except as noted.  Patients history, notes, labs, imaging, vitals and meds reviewed personally.  Clinically no complaints  Awaiting VEEG report  Awaiting DSA  Send for CTA H+N and tumor biomarkers    Plan as above

## 2024-11-04 NOTE — DISCHARGE NOTE NURSING/CASE MANAGEMENT/SOCIAL WORK - PATIENT PORTAL LINK FT
You can access the FollowMyHealth Patient Portal offered by [facility] by registering at the following website: [email or web address]. By joining [employer]’s FollowMyHealth portal, you will also be able to view your health information using other applications (apps) compatible with our system.

## 2024-11-04 NOTE — PROGRESS NOTE ADULT - PROVIDER SPECIALTY LIST ADULT
Hospitalist
Hospitalist
Internal Medicine
Internal Medicine
Hospitalist
Hospitalist
Neurology
Neurology
Hospitalist
Neurology

## 2024-11-04 NOTE — DISCHARGE NOTE NURSING/CASE MANAGEMENT/SOCIAL WORK - FINANCIAL ASSISTANCE
[facility] provides services at a reduced cost to those who are determined to be eligible through [facility]’s financial assistance program. Information regarding [facility]’s financial assistance program can be found by going to [email or web address] or by calling [phone].

## 2024-11-04 NOTE — PROGRESS NOTE ADULT - ASSESSMENT
54-year-old female with no significant past medical history comes to the hospital with first witnessed seizure.  she was admitted to [facility] and now transfserred to [place] for further w/u.    #New onset GTC Seizures  #subacute R temporal lobe hematoma 2.3 cm with diffuse microhemorrhages  #new onset HTN  #Obesity -diet and lifestyle modification     -continue management as per the neurology team---medicine following for comanagement  -s/p DSA performed on 11/3 - On preliminary review, no sign of arteriovenous malformation, fistula, aneurysm, or other irregularity was noted  -continue neurochecks q6hr  -Keppra adjusted to 1000mg daily and 1250mg at bedtime   - VEEG identified an electrographic event at 2am on 11/1  -currently on nifedipine xl 60 mg daily - BP not charted this morning but if remains high - increase Nifedipine to 90mg daily   -no new labs today  -patient stated she ambulates in the unit without assistive devices; denies dizziness; maintain fall precautions    Spent over 55 min reviewing chart, speaking with patient/family and on coordinating patient care during interdisciplinary rounds   Please call me with any questions at extension [phone]

## 2024-11-04 NOTE — PROGRESS NOTE ADULT - ASSESSMENT
53 yo right-handed female w/ HTN in pregnancy, HLD, p/w new onset witnessed GTCS with multiple areas of hypoattenuation on CT suspect provoked seizure. vEEG with R PLEDs and MRI w/ subacute R temporal hematoma with recent CTA w/o LVO. Has evidence of chronic microhemorrhages suspicious for vasculitis vs undiagnosed HTN. Transferred to [place] for DSA with [facility]. CTV neg for sinus thrombosis. EEG captured a 25 sec seizure overnight 10/31. On exam, patient at baseline. Planning for DSA today 11/3.     #subacute R temporal hematoma  - discharge and follow up with neuroIR and stroke clinic  - maintain normotension, c/w nifedipine 60mg qHS  - s/p DSA  - No anti-platelets/anti coagulants at this time   - f/u LABS: ANCA, AHSAN, ESR/CRP, Beta 2 glycoprotein, MMA, homocysteine, thyroid autoantibodies, SSA/SSB, ACE level, Complement levels (CH50, C3, C4)   - PT/OT    #new onset GTCS likely i/s/o R. temporal hematoma  EEG showed 25-second seizure overnight 10/31.  - c/w Keppra 1g qAM and 1250mg qHS   - seizure precautions    #HLD  -   - ASCVD 10 year risk of 1.1%. No statin recommended    #HTN  In the ED, BP was elevated and was seen and she was given IV labetalol   - BP goal: normotension  - c/w nifedipine 60mg qHS    #Misc  F: NI  E: replete K<4, Mg<2  N: DASH  VTE Prophylaxis: SCDs  GI: NI  Activity: IAT  C: Full Code  D: Home

## 2024-11-04 NOTE — PROGRESS NOTE ADULT - SUBJECTIVE AND OBJECTIVE BOX
YOLANDA SANTILLAN  54y  Female      Patient is a 54y old Female who presents with a generalized seizure (04 Nov 2024 07:37)      INTERVAL HPI/OVERNIGHT EVENTS:  Patient seen and examined earlier this morning  lying comfortably in bed  asking to go home   states she is tired but denies any complaints    REVIEW OF SYSTEMS:  CONSTITUTIONAL: No fever, weight loss, or fatigue  EYES: No eye pain, visual disturbances, or discharge  ENMT:  No difficulty hearing, tinnitus, vertigo; No sinus or throat pain  NECK: No pain or stiffness  RESPIRATORY: No cough, wheezing, chills or hemoptysis; No shortness of breath  CARDIOVASCULAR: No chest pain, palpitations, dizziness, or leg swelling  GASTROINTESTINAL: No abdominal or epigastric pain. No nausea, vomiting, or hematemesis; No diarrhea or constipation. No melena or hematochezia.  GENITOURINARY: No dysuria, frequency, hematuria, or incontinence  NEUROLOGICAL: No headaches, memory loss, loss of strength, numbness, or tremors  SKIN: No itching, burning, rashes, or lesions   LYMPH NODES: No enlarged glands  ENDOCRINE: No heat or cold intolerance; No hair loss  MUSCULOSKELETAL: No joint pain or swelling; No muscle, back, or extremity pain  PSYCHIATRIC: No depression, anxiety, mood swings, or difficulty sleeping  HEME/LYMPH: No easy bruising, or bleeding gums  ALLERY AND IMMUNOLOGIC: No hives or eczema    T(C): 36.9 (11-04-24 @ 04:37), Max: 37.2 (11-03-24 @ 21:11)  HR: 103 (11-04-24 @ 04:37) (75 - 103)  BP: 142/86 (11-04-24 @ 04:37) (115/63 - 142/86)  RR: 18 (11-03-24 @ 21:11) (18 - 20)  SpO2: 95% (11-03-24 @ 21:11) (94% - 98%) on ra    PHYSICAL EXAM:  GENERAL: NAD, well-groomed,   HEAD:  Atraumatic, Normocephalic  EYES: conjunctiva and sclera clear  ENMT: Moist mucous membranes,  No visible lesions  NECK: Supple, No JVD, Normal thyroid  NERVOUS SYSTEM:  Alert & Oriented X3, Good concentration; moves all extremities   CHEST/LUNG: good air entry   HEART: Regular rate and rhythm; No murmurs, rubs, or gallops  ABDOMEN: Soft, Nontender, Nondistended; Bowel sounds present, obese  EXTREMITIES:  2+ Peripheral Pulses, No clubbing, cyanosis, or edema  LYMPH: No lymphadenopathy noted  SKIN: No rashes or lesions    Consultant(s) Notes Reviewed:  [x ] YES  [ ] NO  Care Discussed with Consultants/Other Providers [ x] YES  [ ] NO    LAB:                        14.6   7.38  )-----------( 316      ( 03 Nov 2024 06:49 )             45.3     11-03    139  |  102  |  15  ----------------------------<  97  4.0   |  24  |  0.8    Ca    9.2      03 Nov 2024 06:49  Mg     2.2     11-03      Drug Dosing Weight  Height (cm): 157.5 (03 Nov 2024 08:27)  Weight (kg): 102 (03 Nov 2024 08:27)  BMI (kg/m2): 41.1 (03 Nov 2024 08:27)  BSA (m2): 2.01 (03 Nov 2024 08:27)      Urinalysis Basic - ( 03 Nov 2024 06:49 )    Color: x / Appearance: x / SG: x / pH: x  Gluc: 97 mg/dL / Ketone: x  / Bili: x / Urobili: x   Blood: x / Protein: x / Nitrite: x   Leuk Esterase: x / RBC: x / WBC x   Sq Epi: x / Non Sq Epi: x / Bacteria: x        RADIOLOGY & ADDITIONAL TESTS:  Imaging Personally Reviewed:  [x] YES  [ ] NO    HEALTH ISSUES - PROBLEM Dx:  Seizure        MEDS:  iohexol 300 mG (iodine)/mL Oral Solution 30 milliLiter(s) Oral once  levETIRAcetam 1000 milliGRAM(s) Oral daily  levETIRAcetam 1250 milliGRAM(s) Oral at bedtime  lidocaine 2% Viscous 5 milliLiter(s) Swish and Spit three times a day PRN  LORazepam   Injectable 2 milliGRAM(s) IV Push once PRN  NIFEdipine XL 60 milliGRAM(s) Oral <User Schedule>

## 2024-11-05 PROBLEM — Z00.00 ENCOUNTER FOR PREVENTIVE HEALTH EXAMINATION: Status: ACTIVE | Noted: 2024-11-05

## 2024-11-05 PROBLEM — Z78.9 OTHER SPECIFIED HEALTH STATUS: Chronic | Status: ACTIVE | Noted: 2024-10-29

## 2024-11-07 NOTE — CHART NOTE - NSCHARTNOTEFT_GEN_A_CORE
Post-Discharge Medication Review: Completed	  	  Patient's preferred pharmacy was updated in OMR: [facility]	  	  Patient contacted to offer medication counseling post-discharge. Medication reconciliation completed. Per patient, medications include:	  	  1.	Keppra 1000 mg oral tablet 1 tab(s) orally 2 times a day One pill in the morning; One pill at bedtime with the other 250mg pill.  2.	Keppra 250 mg oral tablet 1 tab(s) orally once a day (at bedtime)  3.	NIFEdipine 60 mg oral tablet, extended release 1 tab(s) orally once a day   	  Medication name, indication, administration, side effect, and monitoring reviewed for new medications during post discharge counseling visit with patient. Patient demonstrated understanding. Counseling offered for all medications.	  	  Per inpatient notes and discharge instructions, atorvastatin not required and medication removed from OMR, however, prescription received from [facility] was dispensed to the patient. Informed inpatient team and patient was counseled.   	  Waqas Howell, PharmD	  Clinical Pharmacy Specialist, Pharmacy Telehealth Team	  Can be reached via MS Teams or [phone]

## 2024-11-15 NOTE — CDI QUERY NOTE - NSCDIOTHERTXTBX_GEN_ALL_CORE_HH
Clinical documentation and/or evidence in the medical record indicates that this patient has a neurological diagnosis.  In order to ensure accurate coding and accuracy of the clinical record, the documentation in this patient’s medical record requires additional clarification.      Please include more specific documentation as to the type of neurological diagnosis (if clinically significant) in your progress note and/or discharge summary.      Please clarify the clinical significance of the mild surrounding edema on the MRI brain report:     •	MRI of brain report of clinically significant mild surrounding edema further specified as cerebral edema  •	MRI of brain report of clinically insignificant mild surrounding edema  •	Other (specify)        Supporting documentation and/or clinical evidence:     10/29 Consult Note Adult-Neurology PA/Attending: ... MRI w/ subacute R temporal hematoma with recent CTA without evidence of medium-large vessel disease.  Has evidence of chronic microhemorrhages suspicious for vasculitis vs undiagnosed HTN. ...    11/1 DN Provider: ... subacute R temporal hematoma ... - Maintain normotension, c/w nifedipine 60mg qHS; ... #HTN - In the ED, BP was elevated and was seen and she was given IV labetalol     Vital Signs:   10/28 (2252) /24; (2345) /112  10/29: (0010) /112; (0149) /98; (0346) /123; (0527) /92; (0545) /79; (1521) /101; (1701) /107; (1821) /85; (1912) /90; (1957) /56    Radiology:   10/29 (1625) MR Brain with IV contrast: 1.  Late-subacute parenchymal hematoma within the right temporal lobe measuring about 2.3 cm with mild surrounding edema.    Orders:   10/29: Labetalol 20mg IVP x 3 (0024, 0550, 1726)  10/29-10/4: Procardia 60mg Po QHS  10/29: Hydralazine 10mg IVP x 1 (1939)  10/29: Neurocheck Q4H

## 2024-11-18 ENCOUNTER — APPOINTMENT (OUTPATIENT)
Dept: NEUROLOGY | Facility: CLINIC | Age: 54
End: 2024-11-18

## 2024-11-20 NOTE — CHART NOTE - NSCHARTNOTEFT_GEN_A_CORE
Please clarify the clinical significance of the mild surrounding edema on the MRI brain report:     •	MRI of brain report of clinically insignificant mild surrounding edema

## 2024-11-21 ENCOUNTER — APPOINTMENT (OUTPATIENT)
Dept: NEUROLOGY | Facility: CLINIC | Age: 54
End: 2024-11-21
Payer: COMMERCIAL

## 2024-11-21 VITALS
HEIGHT: 62 IN | BODY MASS INDEX: 40.48 KG/M2 | SYSTOLIC BLOOD PRESSURE: 171 MMHG | HEART RATE: 123 BPM | DIASTOLIC BLOOD PRESSURE: 115 MMHG | WEIGHT: 220 LBS | OXYGEN SATURATION: 97 %

## 2024-11-21 DIAGNOSIS — I62.9 NONTRAUMATIC INTRACRANIAL HEMORRHAGE, UNSPECIFIED: ICD-10-CM

## 2024-11-21 PROCEDURE — 99215 OFFICE O/P EST HI 40 MIN: CPT

## 2024-11-21 PROCEDURE — G2211 COMPLEX E/M VISIT ADD ON: CPT | Mod: NC

## 2024-11-21 RX ORDER — METOPROLOL SUCCINATE 25 MG/1
25 TABLET, EXTENDED RELEASE ORAL DAILY
Qty: 30 | Refills: 0 | Status: ACTIVE | COMMUNITY
Start: 2024-11-21 | End: 1900-01-01

## 2024-11-21 RX ORDER — NIFEDIPINE 60 MG
60 TABLET, EXTENDED RELEASE ORAL DAILY
Refills: 0 | Status: ACTIVE | COMMUNITY

## 2024-11-27 ENCOUNTER — APPOINTMENT (OUTPATIENT)
Dept: NEUROLOGY | Facility: CLINIC | Age: 54
End: 2024-11-27

## 2024-11-29 RX ORDER — LEVETIRACETAM 1000 MG/1
1000 TABLET, FILM COATED ORAL
Qty: 60 | Refills: 2 | Status: ACTIVE | COMMUNITY
Start: 2024-11-29 | End: 1900-01-01

## 2024-11-29 RX ORDER — LEVETIRACETAM 250 MG/1
250 TABLET, FILM COATED ORAL AT BEDTIME
Qty: 30 | Refills: 3 | Status: ACTIVE | COMMUNITY
Start: 2024-11-29 | End: 1900-01-01

## 2024-12-04 ENCOUNTER — APPOINTMENT (OUTPATIENT)
Dept: NEUROSURGERY | Facility: CLINIC | Age: 54
End: 2024-12-04

## 2024-12-04 VITALS — SYSTOLIC BLOOD PRESSURE: 170 MMHG | DIASTOLIC BLOOD PRESSURE: 110 MMHG | HEART RATE: 94 BPM

## 2024-12-04 VITALS — HEIGHT: 62 IN | WEIGHT: 221 LBS | BODY MASS INDEX: 40.67 KG/M2

## 2024-12-04 DIAGNOSIS — Z82.61 FAMILY HISTORY OF ARTHRITIS: ICD-10-CM

## 2024-12-04 DIAGNOSIS — D18.00 HEMANGIOMA UNSPECIFIED SITE: ICD-10-CM

## 2024-12-04 DIAGNOSIS — Z82.49 FAMILY HISTORY OF ISCHEMIC HEART DISEASE AND OTHER DISEASES OF THE CIRCULATORY SYSTEM: ICD-10-CM

## 2024-12-04 DIAGNOSIS — Z78.9 OTHER SPECIFIED HEALTH STATUS: ICD-10-CM

## 2024-12-04 DIAGNOSIS — I10 ESSENTIAL (PRIMARY) HYPERTENSION: ICD-10-CM

## 2024-12-04 PROCEDURE — 99213 OFFICE O/P EST LOW 20 MIN: CPT

## 2024-12-04 RX ORDER — IRBESARTAN AND HYDROCHLOROTHIAZIDE 150; 12.5 MG/1; MG/1
150-12.5 TABLET ORAL
Refills: 0 | Status: ACTIVE | COMMUNITY

## 2024-12-05 ENCOUNTER — APPOINTMENT (OUTPATIENT)
Dept: NEUROLOGY | Facility: CLINIC | Age: 54
End: 2024-12-05

## 2024-12-06 ENCOUNTER — APPOINTMENT (OUTPATIENT)
Dept: NEUROSURGERY | Facility: CLINIC | Age: 54
End: 2024-12-06

## 2024-12-06 PROCEDURE — 99442: CPT

## 2025-02-19 ENCOUNTER — APPOINTMENT (OUTPATIENT)
Dept: NEUROLOGY | Facility: CLINIC | Age: 55
End: 2025-02-19
Payer: COMMERCIAL

## 2025-02-19 ENCOUNTER — NON-APPOINTMENT (OUTPATIENT)
Age: 55
End: 2025-02-19

## 2025-02-19 VITALS — HEART RATE: 88 BPM | SYSTOLIC BLOOD PRESSURE: 151 MMHG | OXYGEN SATURATION: 99 % | DIASTOLIC BLOOD PRESSURE: 97 MMHG

## 2025-02-19 VITALS
OXYGEN SATURATION: 98 % | HEART RATE: 91 BPM | DIASTOLIC BLOOD PRESSURE: 117 MMHG | HEIGHT: 62 IN | WEIGHT: 225.38 LBS | SYSTOLIC BLOOD PRESSURE: 159 MMHG | BODY MASS INDEX: 41.47 KG/M2

## 2025-02-19 DIAGNOSIS — R94.01 ABNORMAL ELECTROENCEPHALOGRAM [EEG]: ICD-10-CM

## 2025-02-19 DIAGNOSIS — I62.9 NONTRAUMATIC INTRACRANIAL HEMORRHAGE, UNSPECIFIED: ICD-10-CM

## 2025-02-19 PROCEDURE — G2211 COMPLEX E/M VISIT ADD ON: CPT | Mod: NC

## 2025-02-19 PROCEDURE — 99215 OFFICE O/P EST HI 40 MIN: CPT

## 2025-03-06 ENCOUNTER — TRANSCRIPTION ENCOUNTER (OUTPATIENT)
Age: 55
End: 2025-03-06

## 2025-03-07 ENCOUNTER — APPOINTMENT (OUTPATIENT)
Dept: NEUROLOGY | Facility: CLINIC | Age: 55
End: 2025-03-07

## 2025-03-07 PROCEDURE — 95723 EEG PHY/QHP>60<84 HR W/O VID: CPT

## 2025-03-07 PROCEDURE — 95708 EEG WO VID EA 12-26HR UNMNTR: CPT

## 2025-03-08 PROCEDURE — 95708 EEG WO VID EA 12-26HR UNMNTR: CPT

## 2025-03-09 PROCEDURE — 95708 EEG WO VID EA 12-26HR UNMNTR: CPT

## 2025-03-10 ENCOUNTER — APPOINTMENT (OUTPATIENT)
Dept: NEUROLOGY | Facility: CLINIC | Age: 55
End: 2025-03-10

## 2025-03-10 PROCEDURE — 95700 EEG CONT REC W/VID EEG TECH: CPT

## 2025-03-24 ENCOUNTER — TRANSCRIPTION ENCOUNTER (OUTPATIENT)
Age: 55
End: 2025-03-24

## 2025-03-24 ENCOUNTER — NON-APPOINTMENT (OUTPATIENT)
Age: 55
End: 2025-03-24

## 2025-03-25 NOTE — ED PROVIDER NOTE - CLINICAL SUMMARY MEDICAL DECISION MAKING FREE TEXT BOX
Patient reports good appetite in house. Per RN flowsheets intake is %. Patient denies any in house nausea, vomiting, diarrhea, or constipation. Last BM noted on 3/24 per RN flowsheets. Patient is edentulous and receives mince and moist consistency. No known food allergies. Medications notable for Lasix, prednisone and insulin. Labs reviewed, A1c 5.7% indicating good control. IV fluids noted for hydration. Encourage PO intake and honor food preferences as able. Educated with DASH/TLC diet. RD to remain available for further nutritional interventions as indicated  54yF   n opmhx  noncompliant with  annual pmd evals pw  seizure lieke activity . Pt explains had 2 weeks of sore throat, tonight around 9pm  had headache felt nauseated  vomited once  returned ot bed  called for he brother-  when brother was in the room ,  she was  staring  and holding a paper towel in her right hand that she was holding  above her head.  Pt was answering questions during this but was "spaced out" per broth er.  Pt  recalls this event  and feeling unwell.   brother  says she had some shaking  and urinated herself Pt  didnto remember the shaking or urianting.   Pt back to baseline about an hour later.  Pt still co headache   Alert and oriented.  CN 2-12 intact.  Motor strength and sensory response is symmetric.  CB intact.  no tongue bite  CTH: Decreased attenuation of periventricular and subcortical white matter,   	nonspecific in 54-year-old patient, and considerations include   	inflammatory and demyelinating diseases such as multiple sclerosis.   	Confluent regional decreased attenuation at right temporal lobe with loss   	of gray-white junction, which could represent area of early ischemic   	change. Further evaluation recommended with MR brain with IV contrast.  	  	Chronic ischemic change to left occipital lobe.  CTA head and neck  added - negative    PT BP elevated  190-210 systolic  neuro consulted -  recommend  q4hr neuro checks  Pt admitted to stepdown , asa and antihypertensive  given

## 2025-04-29 ENCOUNTER — TRANSCRIPTION ENCOUNTER (OUTPATIENT)
Age: 55
End: 2025-04-29

## 2025-05-30 ENCOUNTER — TRANSCRIPTION ENCOUNTER (OUTPATIENT)
Age: 55
End: 2025-05-30

## 2025-06-06 ENCOUNTER — OUTPATIENT (OUTPATIENT)
Dept: OUTPATIENT SERVICES | Facility: HOSPITAL | Age: 55
LOS: 1 days | End: 2025-06-06
Payer: COMMERCIAL

## 2025-06-06 ENCOUNTER — RESULT REVIEW (OUTPATIENT)
Age: 55
End: 2025-06-06

## 2025-06-06 DIAGNOSIS — D18.00 HEMANGIOMA UNSPECIFIED SITE: ICD-10-CM

## 2025-06-06 DIAGNOSIS — Z00.8 ENCOUNTER FOR OTHER GENERAL EXAMINATION: ICD-10-CM

## 2025-06-06 PROCEDURE — 70553 MRI BRAIN STEM W/O & W/DYE: CPT

## 2025-06-06 PROCEDURE — A9579: CPT

## 2025-06-06 PROCEDURE — 70553 MRI BRAIN STEM W/O & W/DYE: CPT | Mod: 26

## 2025-06-07 DIAGNOSIS — D18.00 HEMANGIOMA UNSPECIFIED SITE: ICD-10-CM

## 2025-06-10 ENCOUNTER — NON-APPOINTMENT (OUTPATIENT)
Age: 55
End: 2025-06-10

## 2025-06-13 ENCOUNTER — APPOINTMENT (OUTPATIENT)
Dept: NEUROSURGERY | Facility: CLINIC | Age: 55
End: 2025-06-13

## 2025-06-13 VITALS
SYSTOLIC BLOOD PRESSURE: 140 MMHG | DIASTOLIC BLOOD PRESSURE: 90 MMHG | WEIGHT: 199 LBS | HEART RATE: 77 BPM | OXYGEN SATURATION: 98 % | HEIGHT: 62 IN | BODY MASS INDEX: 36.62 KG/M2

## 2025-06-13 PROBLEM — Z09 FOLLOW-UP EXAM: Status: ACTIVE | Noted: 2025-06-13

## 2025-06-13 PROCEDURE — 99213 OFFICE O/P EST LOW 20 MIN: CPT

## 2025-06-13 RX ORDER — DIPHENHYDRAMINE HCL 50 MG/1
50 CAPSULE ORAL
Qty: 1 | Refills: 0 | Status: COMPLETED | COMMUNITY
Start: 2025-06-13 | End: 2025-06-14

## 2025-06-13 RX ORDER — NIFEDIPINE 30 MG/1
30 TABLET, EXTENDED RELEASE ORAL
Refills: 0 | Status: ACTIVE | COMMUNITY

## 2025-06-13 RX ORDER — PREDNISONE 50 MG/1
50 TABLET ORAL
Qty: 3 | Refills: 0 | Status: COMPLETED | COMMUNITY
Start: 2025-06-13 | End: 2025-06-16

## 2025-06-19 ENCOUNTER — APPOINTMENT (OUTPATIENT)
Dept: NEUROLOGY | Facility: CLINIC | Age: 55
End: 2025-06-19

## 2025-08-27 ENCOUNTER — RX RENEWAL (OUTPATIENT)
Age: 55
End: 2025-08-27

## 2025-08-27 ENCOUNTER — TRANSCRIPTION ENCOUNTER (OUTPATIENT)
Age: 55
End: 2025-08-27